# Patient Record
Sex: FEMALE | Race: WHITE | NOT HISPANIC OR LATINO
[De-identification: names, ages, dates, MRNs, and addresses within clinical notes are randomized per-mention and may not be internally consistent; named-entity substitution may affect disease eponyms.]

---

## 2021-10-18 DIAGNOSIS — Z00.00 ENCOUNTER FOR GENERAL ADULT MEDICAL EXAMINATION W/OUT ABNORMAL FINDINGS: ICD-10-CM

## 2021-10-19 ENCOUNTER — APPOINTMENT (OUTPATIENT)
Dept: ORTHOPEDIC SURGERY | Facility: CLINIC | Age: 61
End: 2021-10-19
Payer: COMMERCIAL

## 2021-10-19 VITALS
SYSTOLIC BLOOD PRESSURE: 156 MMHG | WEIGHT: 170 LBS | HEART RATE: 86 BPM | HEIGHT: 63.5 IN | BODY MASS INDEX: 29.75 KG/M2 | DIASTOLIC BLOOD PRESSURE: 92 MMHG

## 2021-10-19 PROCEDURE — 99203 OFFICE O/P NEW LOW 30 MIN: CPT | Mod: 25

## 2021-10-19 PROCEDURE — 20610 DRAIN/INJ JOINT/BURSA W/O US: CPT | Mod: LT

## 2021-10-19 PROCEDURE — 99072 ADDL SUPL MATRL&STAF TM PHE: CPT

## 2021-10-19 PROCEDURE — 73564 X-RAY EXAM KNEE 4 OR MORE: CPT | Mod: LT

## 2021-10-19 NOTE — HISTORY OF PRESENT ILLNESS
[de-identified] : This is a very nice  61 year old  female experiencing worsening pain in the left knee which is severe in intensity and has been going on for at least 3 months now. The pain limits activities of daily living. Walking tolerance is reduced. Medication (Advil and Glucosamine) and activity modification have been minimally effective for a period lasting greater than three months in duration. Assistive devices and external support were not deemed by the patient to be helpful in improving their function. The patient denies any radiation of the pain to the feet and it is not associated with numbness, tingling, or weakness.

## 2021-10-19 NOTE — PHYSICAL EXAM
[de-identified] : Well developed, well nourished in no apparent distress, awake, alert and orientated to person, place and time. with appropriate mood and affect. \par Respirations are even and unlabored. Gait evaluation does reveal a limp. There is no inguinal adenopathy. \par The affected limb is well-perfused, without skin lesions, shows a grossly normal motor and sensory examination. \par Knee motion is significantly reduced and does cause significant pain. ROM of the knee is 0-120 degrees.  5 degrees varus\par The knee is stable within that range-of-motion to AP and ML stress. \par Muscle strength is normal. Pedal pulses are palpable.  [de-identified] : Long standing  knee, AP knee, lateral knee, and patellar views of the left knee were ordered and taken in the office and demonstrate mild-mod degenerative joint disease of the knee with joint space narrowing, osteophyte formation, and subchondral sclerosis.

## 2021-10-19 NOTE — DISCUSSION/SUMMARY
[de-identified] : The patient has mild-mod left knee osteoarthritis. They are not an appropriate candidate for surgical intervention at this time. An extensive discussion was conducted on the natural history of the disease and the variety of surgical and non-surgical options available to the patient including, but not limited to non-steroidal anti-inflammatory medications, steroid injections, physical therapy, maintenance of ideal body weight, and reduction of activity. I recommended and prescribed a course of physical therapy. The patient will schedule an appointment as needed. \par \par Informed consent for the left knee injection was obtained. All questions were answered. A time out was performed. The  knee was prepped and draped in sterile fashion. Using sterile technique, 2cc of Kenalog, 4cc of 1% lidocaine, 4cc of 0.25% marcaine using a 21-gauge needle. A sterile dressing was applied. Post injection instructions were reviewed. The patient tolerated the procedure well.

## 2021-10-19 NOTE — REASON FOR VISIT
Telephone Encounter by Constance Merino MD at 08/15/17 03:13 PM     Author:  Constance Merino MD Service:  (none) Author Type:  Physician     Filed:  08/15/17 03:14 PM Encounter Date:  8/15/2017 Status:  Signed     :  Constance Merino MD (Physician)            i see pt has appt with me tomorrow  Will see her then[SO1.1M]  Dr. Constance Merino[SO1.1T]       Revision History        User Key Date/Time User Provider Type Action    > SO1.1 08/15/17 03:14 PM Constance Merino MD Physician Sign    M - Manual, T - Template             [Initial Visit] : an initial visit for [Knee Pain] : knee pain [Spouse] : spouse

## 2022-04-11 ENCOUNTER — APPOINTMENT (OUTPATIENT)
Dept: ORTHOPEDIC SURGERY | Facility: CLINIC | Age: 62
End: 2022-04-11
Payer: COMMERCIAL

## 2022-04-11 VITALS — HEIGHT: 63 IN | WEIGHT: 170 LBS | BODY MASS INDEX: 30.12 KG/M2

## 2022-04-11 PROCEDURE — 99072 ADDL SUPL MATRL&STAF TM PHE: CPT

## 2022-04-11 PROCEDURE — 20610 DRAIN/INJ JOINT/BURSA W/O US: CPT | Mod: LT

## 2022-04-11 PROCEDURE — 99214 OFFICE O/P EST MOD 30 MIN: CPT | Mod: 25

## 2022-04-11 RX ORDER — SERTRALINE HYDROCHLORIDE 100 MG/1
100 TABLET, FILM COATED ORAL
Qty: 30 | Refills: 0 | Status: ACTIVE | COMMUNITY
Start: 2021-08-23

## 2022-04-11 RX ORDER — AMOXICILLIN 875 MG/1
875 TABLET, FILM COATED ORAL
Qty: 10 | Refills: 0 | Status: ACTIVE | COMMUNITY
Start: 2022-02-07

## 2022-04-11 NOTE — REASON FOR VISIT
[Follow-Up Visit] : a follow-up visit for [Knee Pain] : knee pain [Osteoarthritis, Knee] : osteoarthritis, knee [Spouse] : spouse

## 2022-04-11 NOTE — PHYSICAL EXAM
[de-identified] : Well developed, well nourished in no apparent distress, awake, alert and orientated to person, place and time. with appropriate mood and affect. \par Respirations are even and unlabored. Gait evaluation does reveal a limp. There is no inguinal adenopathy. \par The affected limb is well-perfused, without skin lesions, shows a grossly normal motor and sensory examination. \par Knee motion is significantly reduced and does cause significant pain. ROM of the knee is 0-120 degrees.  5 degrees varus\par The knee is stable within that range-of-motion to AP and ML stress. \par Muscle strength is normal. Pedal pulses are palpable.  [de-identified] : Long standing  knee, AP knee, lateral knee, and patellar views of the left knee were ordered and taken in the office and demonstrate mild-mod degenerative joint disease of the knee with joint space narrowing, osteophyte formation, and subchondral sclerosis.

## 2022-04-11 NOTE — HISTORY OF PRESENT ILLNESS
[de-identified] : This is a very nice 62 year old  female experiencing worsening pain in the left knee which is severe in intensity and has been going on for at least 9 months now. The pain limits activities of daily living. Walking tolerance is reduced.  I have previously diagnosed with left knee osteoarthritis.  Medication (Advil and Glucosamine) and activity modification have been minimally effective for a period lasting greater than three months in duration.  Prior cortisone injection helped her for about 5 months. The patient denies any radiation of the pain to the feet and it is not associated with numbness, tingling, or weakness.

## 2022-04-11 NOTE — DISCUSSION/SUMMARY
[de-identified] : The patient has mild-mod left knee osteoarthritis. They are not an appropriate candidate for surgical intervention at this time. An extensive discussion was conducted on the natural history of the disease and the variety of surgical and non-surgical options available to the patient including, but not limited to non-steroidal anti-inflammatory medications, steroid injections, physical therapy, maintenance of ideal body weight, and reduction of activity.  She prefers to take a home exercise program rather than formal physical therapy.  Does not want a prescription for Mobic.  Today we performed a left knee intra-articular cortisone injection.  The patient will schedule an appointment as needed. \par \par Informed consent for the left knee injection was obtained. All questions were answered. A time out was performed. The left knee was prepped and draped in sterile fashion. Using sterile technique, the left knee was injected with 80mg of Kenalog, 4cc of 1% lidocaine, 4cc of 0.25% marcaine using a 21-gauge needle. A sterile dressing was applied. Post injection instructions were reviewed. The patient tolerated the procedure well.\par

## 2022-05-04 RX ORDER — HYALURONATE SODIUM 20 MG/2 ML
20 SYRINGE (ML) INTRAARTICULAR DAILY
Qty: 1 | Refills: 0 | Status: ACTIVE | COMMUNITY
Start: 2022-05-04

## 2022-05-17 ENCOUNTER — APPOINTMENT (OUTPATIENT)
Dept: ORTHOPEDIC SURGERY | Facility: CLINIC | Age: 62
End: 2022-05-17
Payer: COMMERCIAL

## 2022-05-17 PROCEDURE — 20610 DRAIN/INJ JOINT/BURSA W/O US: CPT

## 2022-05-17 PROCEDURE — 99072 ADDL SUPL MATRL&STAF TM PHE: CPT

## 2022-05-17 NOTE — PROCEDURE
[de-identified] : Injection: Left knee joint.\par Indication: Osteoarthritis.\par \par A discussion was had with the patient regarding this procedure and all questions were answered. All risks, benefits and alternatives were discussed. These included but were not limited to bleeding, infection, and allergic reaction. Alcohol was used to clean the skin, and betadine was used to sterilize and prep the area in the anterolateral aspect of the knee. Ethyl chloride spray was then used as a topical anesthetic. A 22-gauge needle was used to inject 2 cc of Euflexxa into the knee with ease. A sterile bandage was then applied. The patient tolerated the procedure well and there were no complications. \par \par Lot #: [id number]\par Exp: 2023-3-21\par \par The 1 of three Euflexxa injections was given today under sterile conditions into the left knee joint without complication (see procedure note). I again discussed the role of activity modification/icing following the injection to treat any local irritation from the injection. \par \par I will have the patient followup for the next injection in approximately 1 week.\par

## 2022-05-24 ENCOUNTER — APPOINTMENT (OUTPATIENT)
Dept: ORTHOPEDIC SURGERY | Facility: CLINIC | Age: 62
End: 2022-05-24
Payer: COMMERCIAL

## 2022-05-24 ENCOUNTER — TRANSCRIPTION ENCOUNTER (OUTPATIENT)
Age: 62
End: 2022-05-24

## 2022-05-24 PROCEDURE — 20610 DRAIN/INJ JOINT/BURSA W/O US: CPT

## 2022-05-24 PROCEDURE — 99072 ADDL SUPL MATRL&STAF TM PHE: CPT

## 2022-05-24 NOTE — PROCEDURE
[de-identified] : Injection: Left knee joint.\par Indication: Osteoarthritis.\par \par A discussion was had with the patient regarding this procedure and all questions were answered. All risks, benefits and alternatives were discussed. These included but were not limited to bleeding, infection, and allergic reaction. Alcohol was used to clean the skin, and betadine was used to sterilize and prep the area in the anterolateral aspect of the knee. Ethyl chloride spray was then used as a topical anesthetic. A 22-gauge needle was used to inject 2 cc of Euflexxa into the knee with ease. A sterile bandage was then applied. The patient tolerated the procedure well and there were no complications. \par \par Lot #: [id number]\par Exp: 2023-3-21\par \par The 2 of three Euflexxa injections was given today under sterile conditions into the left knee joint without complication (see procedure note). I again discussed the role of activity modification/icing following the injection to treat any local irritation from the injection. \par \par I will have the patient followup for the next injection in approximately 1 week.\par

## 2022-05-31 ENCOUNTER — APPOINTMENT (OUTPATIENT)
Dept: ORTHOPEDIC SURGERY | Facility: CLINIC | Age: 62
End: 2022-05-31
Payer: COMMERCIAL

## 2022-05-31 PROCEDURE — 99072 ADDL SUPL MATRL&STAF TM PHE: CPT

## 2022-05-31 PROCEDURE — 20610 DRAIN/INJ JOINT/BURSA W/O US: CPT

## 2022-05-31 NOTE — PROCEDURE
[de-identified] : Injection: Left knee joint.\par Indication: Osteoarthritis.\par \par A discussion was had with the patient regarding this procedure and all questions were answered. All risks, benefits and alternatives were discussed. These included but were not limited to bleeding, infection, and allergic reaction. Alcohol was used to clean the skin, and betadine was used to sterilize and prep the area in the anterolateral aspect of the knee. Ethyl chloride spray was then used as a topical anesthetic. A 22-gauge needle was used to inject 2 cc of Euflexxa into the knee with ease. A sterile bandage was then applied. The patient tolerated the procedure well and there were no complications. \par \par Lot #: [id number]\par Exp: 2023-3-21\par \par The 3 of three Euflexxa injections was given today under sterile conditions into the left knee joint without complication (see procedure note). I again discussed the role of activity modification/icing following the injection to treat any local irritation from the injection. \par

## 2022-06-10 ENCOUNTER — NON-APPOINTMENT (OUTPATIENT)
Age: 62
End: 2022-06-10

## 2022-06-13 RX ORDER — METHYLPREDNISOLONE 4 MG/1
4 TABLET ORAL
Qty: 1 | Refills: 0 | Status: ACTIVE | COMMUNITY
Start: 2022-06-13 | End: 1900-01-01

## 2022-07-06 ENCOUNTER — APPOINTMENT (OUTPATIENT)
Dept: ORTHOPEDIC SURGERY | Facility: CLINIC | Age: 62
End: 2022-07-06

## 2022-07-06 VITALS — WEIGHT: 170 LBS | BODY MASS INDEX: 29.02 KG/M2 | HEIGHT: 64 IN

## 2022-07-06 PROCEDURE — 99214 OFFICE O/P EST MOD 30 MIN: CPT

## 2022-07-06 PROCEDURE — 72100 X-RAY EXAM L-S SPINE 2/3 VWS: CPT

## 2022-07-06 PROCEDURE — 99072 ADDL SUPL MATRL&STAF TM PHE: CPT

## 2022-07-08 ENCOUNTER — APPOINTMENT (OUTPATIENT)
Dept: ORTHOPEDIC SURGERY | Facility: CLINIC | Age: 62
End: 2022-07-08

## 2022-07-08 VITALS — WEIGHT: 170 LBS | BODY MASS INDEX: 29.02 KG/M2 | HEIGHT: 64 IN

## 2022-07-08 PROCEDURE — 73502 X-RAY EXAM HIP UNI 2-3 VIEWS: CPT

## 2022-07-08 PROCEDURE — 99072 ADDL SUPL MATRL&STAF TM PHE: CPT

## 2022-07-08 PROCEDURE — 99214 OFFICE O/P EST MOD 30 MIN: CPT

## 2022-07-09 NOTE — HISTORY OF PRESENT ILLNESS
[de-identified] : This is a very nice 62 year old  female experiencing new complaint of right hip and low back and left knee worsening painwhich is severe in intensity and has been going on for more than 1 year.  She has known left knee osteoarthritis.. The pain limits activities of daily living. Walking tolerance is reduced.  I have previously diagnosed with left knee osteoarthritis.  Medication (Advil and Glucosamine) and activity modification have been minimally effective for a period lasting greater than three months in duration.  Prior cortisone injection helped her for about 5 months. The patient denies any radiation of the pain to the feet and it is not associated with numbness, tingling, or weakness.

## 2022-07-09 NOTE — DISCUSSION/SUMMARY
[de-identified] : The patient has mild-mod left knee osteoarthritis and right hip pain which appears to be extra-articular and potentially referred from the lumbar spine but I cannot rule out the hip as she does have a positive examination for hip findings.  This could represent labral pathology.. They are not an appropriate candidate for surgical intervention at this time. An extensive discussion was conducted on the natural history of the disease and the variety of surgical and non-surgical options available to the patient including, but not limited to non-steroidal anti-inflammatory medications, steroid injections, physical therapy, maintenance of ideal body weight, and reduction of activity.  She prefers to take a home exercise program rather than formal physical therapy.  Does not want a prescription for Mobic.  I will obtain an MRI of the lumbar spine to rule out stenosis and an MRI of the right hip to evaluate for labral tear.  The patient will be sent for a MRI of the right hip and lumbar spine. They will notify me when the MRI is complete and we will arrange for either an in person or telehealth virtual visit to review the results as well as any next steps in the plan.\par

## 2022-07-09 NOTE — REASON FOR VISIT
[Follow-Up Visit] : a follow-up visit for [Hip Pain] : hip pain [Knee Pain] : knee pain [Osteoarthritis, Knee] : osteoarthritis, knee

## 2022-07-09 NOTE — PHYSICAL EXAM
[de-identified] : Patient is well nourished, well-developed, in no acute distress, with appropriate mood and affect. The patient is oriented to time, place, and person. Respirations are even and unlabored. Gait evaluation reveals a limp. There is no inguinal adenopathy. Examination of the contralateral hip shows normal range of motion, strength, no tenderness, and intact skin. The affected limb is well-perfused, shows a grossly normal motor and sensory examination. Examination of the hip shows no skin lesions. Hip motion is reduced and causes pain. FADIR is positive and YARELI is positive. Stinchfield test is positive. Leg lengths are approximately equal. Both hips are stable and muscle strength is normal. Pedal pulses are palpable. The left limb is well-perfused, without skin lesions, shows a grossly normal motor and sensory examination. \par Knee motion is significantly reduced and does cause significant pain. ROM of the knee is 0-120 degrees.  5 degrees varus\par The knee is stable within that range-of-motion to AP and ML stress. \par Muscle strength is normal. Pedal pulses are palpable.  [de-identified] : Long standing  knee, AP knee, lateral knee, and patellar views of the left knee were reviewed from the previous visit and demonstrate mild-mod degenerative joint disease of the knee with joint space narrowing, osteophyte formation, and subchondral sclerosis. \par \par AP and lateral x-rays of the right hip, pelvis, and femur were ordered and taken in the office and demonstrate no evidence of degenerative joint disease of the hip with maintained joint space and no evidence of fractures or other intraarticular pathology.

## 2022-07-20 ENCOUNTER — APPOINTMENT (OUTPATIENT)
Dept: ORTHOPEDIC SURGERY | Facility: CLINIC | Age: 62
End: 2022-07-20

## 2022-07-20 ENCOUNTER — NON-APPOINTMENT (OUTPATIENT)
Age: 62
End: 2022-07-20

## 2022-07-20 DIAGNOSIS — M54.16 RADICULOPATHY, LUMBAR REGION: ICD-10-CM

## 2022-07-20 PROCEDURE — 99443: CPT

## 2022-07-26 ENCOUNTER — NON-APPOINTMENT (OUTPATIENT)
Age: 62
End: 2022-07-26

## 2022-07-27 ENCOUNTER — APPOINTMENT (OUTPATIENT)
Dept: ORTHOPEDIC SURGERY | Facility: CLINIC | Age: 62
End: 2022-07-27

## 2022-07-27 PROCEDURE — 99214 OFFICE O/P EST MOD 30 MIN: CPT | Mod: 25

## 2022-07-27 PROCEDURE — 20610 DRAIN/INJ JOINT/BURSA W/O US: CPT | Mod: LT

## 2022-07-27 PROCEDURE — 99072 ADDL SUPL MATRL&STAF TM PHE: CPT

## 2022-07-27 NOTE — HISTORY OF PRESENT ILLNESS
[de-identified] : This is a very nice  61 year old  female experiencing worsening pain in the left knee which is severe in intensity and has been going on for at least 3 months now. L knee MRI shows  OA and meniscal tear, degenerative.  The pain limits activities of daily living. Walking tolerance is reduced. Medication (Advil and Glucosamine) and activity modification have been minimally effective for a period lasting greater than three months in duration. Assistive devices and external support were not deemed by the patient to be helpful in improving their function. The patient denies any radiation of the pain to the feet and it is not associated with numbness, tingling, or weakness.

## 2022-07-27 NOTE — DISCUSSION/SUMMARY
[de-identified] : The patient has left knee osteoarthritis and degenerative meniscus tear on MRI. They are not an appropriate candidate for surgical intervention at this time. An extensive discussion was conducted on the natural history of the disease and the variety of surgical and non-surgical options available to the patient including, but not limited to non-steroidal anti-inflammatory medications, steroid injections, physical therapy, maintenance of ideal body weight, and reduction of activity. I recommended a prescription of Mobic but the patient would prefer to use OTC NSAIDs at this time. The patient will schedule an appointment as needed. \par \par Informed consent for the left knee injection was obtained. All questions were answered. A time out was performed. The  knee was prepped and draped in sterile fashion. Using sterile technique, 80mg of Kenalog, 4cc of 1% lidocaine, 4cc of 0.25% marcaine using a 21-gauge needle. A sterile dressing was applied. Post injection instructions were reviewed. The patient tolerated the procedure well.

## 2022-07-27 NOTE — PHYSICAL EXAM
[de-identified] : Well developed, well nourished in no apparent distress, awake, alert and orientated to person, place and time. with appropriate mood and affect. \par Respirations are even and unlabored. Gait evaluation does reveal a limp. There is no inguinal adenopathy. \par The affected limb is well-perfused, without skin lesions, shows a grossly normal motor and sensory examination. \par Knee motion is significantly reduced and does cause significant pain. ROM of the knee is 0-120 degrees.  5 degrees varus\par The knee is stable within that range-of-motion to AP and ML stress. \par Muscle strength is normal. Pedal pulses are palpable.

## 2022-08-01 ENCOUNTER — APPOINTMENT (OUTPATIENT)
Dept: ORTHOPEDIC SURGERY | Facility: CLINIC | Age: 62
End: 2022-08-01

## 2022-08-01 VITALS — HEIGHT: 64 IN | BODY MASS INDEX: 28.17 KG/M2 | WEIGHT: 165 LBS

## 2022-08-01 PROCEDURE — 99072 ADDL SUPL MATRL&STAF TM PHE: CPT

## 2022-08-01 PROCEDURE — 99215 OFFICE O/P EST HI 40 MIN: CPT

## 2022-08-01 NOTE — DISCUSSION/SUMMARY
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  She also severe edema of the proximal femur.  She has failed a course of conservative management and would like to proceed with a direct anterior approach right total of arthroplasty.\par \par The patient is an appropriate candidate for consideration of right total hip replacement. An extensive discussion was conducted of the natural history of the disease and the variety of surgical and non-surgical treatment options available to the patient. A risk/benefit analysis was discussed with the patient reviewing the advantages and disadvantages of surgical intervention at this time. Both the level and length of the patient's pain have made additional conservative treatment measures consisting of NSAIDs, physical therapy, and/or corticosteroids contraindicated. A full explanation was given of the nature and the purpose of the procedure and anesthesia, its benefits, possible alternative methods of diagnosis or treatment, the risks involved, the possibility of complications, the foreseeable consequences of the procedure and the possible results of the non-treatment. I reviewed the plan of care as well as used a model of a total hip implant equivalent to the one that will be used for their total hip joint replacement. The ability to secure the implant utilizing cement or cementless (press-fit) was discussed with the patient. The patient agrees with the plan of care, as well as the use of implants for their total hip replacement. \par \par No guarantee or assurance was made as to the results that may be obtained. Specifically, the risks were identified to include, but are not limited to the following: Infection, phlebitis, pulmonary embolism, death, paralysis, dislocation, pain, stiffness, instability, limp, weakness, breakage, leg-length inequality, uncontrolled bleeding, nerve injury, blood vessel injury, pressure sores, anesthetic risks, delayed healing of wound and bone, and wear and loosening. Further discussion was undertaken with the patient about the details of surgical preparation, treatment, and postoperative rehabilitation including medical clearance, autotransfusion, the hospital course, and the postoperative rehabilitation involved. All in all, I feel that this patient is a good candidate for surgical reconstruction.\par \par The patient and I discussed the current SARS-CoV-2 (COVID-19) pandemic which has affected our local hospitals. We discussed that our hospitals treat patients with COVID-19. All efforts will be made to avoid cohorting the patient with diagnosed or suspected COVID-19 patient. They also understand that we will screen them 24-48 hours prior to surgery. Despite our best efforts, there is a potential risk for iatrogenic transmission of COVID-19 to the patient during the perioperative period. Yael COVID-19 during the perioperative period may increase the patient´s risks of an adverse outcome including postoperative pneumonia, difficulty breathing, requirement for a breathing tube (general endotracheal intubation), and death. The patient is understanding of this risk, and is willing to proceed with surgery at this time.\par \par The patient and I discussed the option for outpatient joint replacement. They will not stay overnight in the hospital after surgery and will discharge home on the same day of surgery. The risks and benefits of this type of rapid recovery protocol was reviewed with the patient and they are in agreement with proceeding with outpatient joint replacement surgery. They understand that in the event of an emergency, that they will be transferred to the main hospital for inpatient care.

## 2022-08-01 NOTE — PHYSICAL EXAM
[de-identified] : Patient is well nourished, well-developed, in no acute distress, with appropriate mood and affect. The patient is oriented to time, place, and person. Respirations are even and unlabored. Gait evaluation reveals a limp. There is no inguinal adenopathy. Examination of the contralateral hip shows normal range of motion, strength, no tenderness, and intact skin. The affected limb is well-perfused, shows a grossly normal motor and sensory examination. Examination of the hip shows no skin lesions. Hip motion is reduced and causes pain. FADIR is positive and YARELI is positive. Stinchfield test is positive. Leg lengths are approximately equal. Both hips are stable and muscle strength is normal. Pedal pulses are palpable. [de-identified] : AP and lateral x-rays of the right hip, pelvis, and femur were reviewed from the previous visit and demonstrate no evidence of degenerative joint disease of the hip with maintained joint space and no evidence of fractures or other intraarticular pathology.\par \par The patient brings with her an MRI of the right hip.  I reviewed the MR imaging with the patient which demonstrates right hip osteonecrosis with severe edema throughout the hip as well as a large hip joint effusion.  There is also mild collapse of the femoral head.

## 2022-08-01 NOTE — HISTORY OF PRESENT ILLNESS
[de-identified] : This is very nice 62-year-old female experiencing right hip and groin and thigh pain, which is severe in intensity. The pain substantially limits activities of daily living. Walking tolerance is reduced. Medication and activity modification have been minimally effective for a period lasting greater than three months in duration. Assistive devices and external support were not deemed by the patient to be helpful in improving their function. Due to the severity of osteoarthritis and level of pain, physical therapy is contraindicated. Pain and restriction of function are intolerable at this time. The patient denies any radiation of the pain to the feet and it is not associated with numbness, tingling, or weakness.

## 2022-08-22 ENCOUNTER — APPOINTMENT (OUTPATIENT)
Dept: ORTHOPEDIC SURGERY | Facility: CLINIC | Age: 62
End: 2022-08-22

## 2022-09-02 ENCOUNTER — OUTPATIENT (OUTPATIENT)
Dept: OUTPATIENT SERVICES | Facility: HOSPITAL | Age: 62
LOS: 1 days | End: 2022-09-02
Payer: COMMERCIAL

## 2022-09-02 VITALS
HEART RATE: 72 BPM | HEIGHT: 64 IN | OXYGEN SATURATION: 96 % | TEMPERATURE: 99 F | SYSTOLIC BLOOD PRESSURE: 158 MMHG | RESPIRATION RATE: 15 BRPM | WEIGHT: 164.02 LBS | DIASTOLIC BLOOD PRESSURE: 66 MMHG

## 2022-09-02 DIAGNOSIS — Z01.818 ENCOUNTER FOR OTHER PREPROCEDURAL EXAMINATION: ICD-10-CM

## 2022-09-02 DIAGNOSIS — M16.11 UNILATERAL PRIMARY OSTEOARTHRITIS, RIGHT HIP: ICD-10-CM

## 2022-09-02 DIAGNOSIS — Z98.890 OTHER SPECIFIED POSTPROCEDURAL STATES: Chronic | ICD-10-CM

## 2022-09-02 DIAGNOSIS — I49.8 OTHER SPECIFIED CARDIAC ARRHYTHMIAS: ICD-10-CM

## 2022-09-02 PROCEDURE — 86901 BLOOD TYPING SEROLOGIC RH(D): CPT

## 2022-09-02 PROCEDURE — 86900 BLOOD TYPING SEROLOGIC ABO: CPT

## 2022-09-02 PROCEDURE — 83036 HEMOGLOBIN GLYCOSYLATED A1C: CPT

## 2022-09-02 PROCEDURE — 93005 ELECTROCARDIOGRAM TRACING: CPT

## 2022-09-02 PROCEDURE — 87640 STAPH A DNA AMP PROBE: CPT

## 2022-09-02 PROCEDURE — 36415 COLL VENOUS BLD VENIPUNCTURE: CPT

## 2022-09-02 PROCEDURE — 85027 COMPLETE CBC AUTOMATED: CPT

## 2022-09-02 PROCEDURE — 87641 MR-STAPH DNA AMP PROBE: CPT

## 2022-09-02 PROCEDURE — 86850 RBC ANTIBODY SCREEN: CPT

## 2022-09-02 PROCEDURE — 80048 BASIC METABOLIC PNL TOTAL CA: CPT

## 2022-09-02 PROCEDURE — G0463: CPT

## 2022-09-02 PROCEDURE — 93010 ELECTROCARDIOGRAM REPORT: CPT

## 2022-09-02 RX ORDER — SODIUM CHLORIDE 9 MG/ML
1000 INJECTION, SOLUTION INTRAVENOUS
Refills: 0 | Status: DISCONTINUED | OUTPATIENT
Start: 2022-09-12 | End: 2022-09-26

## 2022-09-02 NOTE — H&P PST ADULT - NSICDXPASTMEDICALHX_GEN_ALL_CORE_FT
PAST MEDICAL HISTORY:  Chronic back pain     Depression     Left knee pain     Mitral valve prolapse     PVCs (premature ventricular contractions) ablation    Unilateral primary osteoarthritis, right hip      PAST MEDICAL HISTORY:  Bigeminy     Chronic back pain     Depression     Left knee pain     Mitral valve prolapse     PVCs (premature ventricular contractions) ablation    Unilateral primary osteoarthritis, right hip

## 2022-09-02 NOTE — H&P PST ADULT - HISTORY OF PRESENT ILLNESS
62 yr old female  with hx of PVCs s/p ablation, osteoarthritis, chronic knee and back pain. Recently had back pain evaluation and was referred for work up of right hip.  Referred for total right hip  replacement.     **COVID  denies foreign travel  denies s/s   denies known exposure  swab 9/9 Zhang  vaccine Pfizer x2 20221 does not have dates     ***HX OF PVC s/p ablation *****  ek[facility] at [facility] asymptomatic  discussed via Teams with Dr. Garcia and Dr. Giron.  Pt to see cardiologist pre op to surgery.

## 2022-09-02 NOTE — H&P PST ADULT - NSANTHOSAYNRD_GEN_A_CORE
No. NADEEM screening performed.  STOP BANG Legend: 0-2 = LOW Risk; 3-4 = INTERMEDIATE Risk; 5-8 = HIGH Risk

## 2022-09-02 NOTE — H&P PST ADULT - ASSESSMENT
CAPRINI SCORE [CLOT]    AGE RELATED RISK FACTORS                                                       MOBILITY RELATED FACTORS  [ ] Age 41-60 years                                            (1 Point)                  [ ] Bed rest                                                        (1 Point)  [ x] Age: 61-74 years                                           (2 Points)                 [ ] Plaster cast                                                   (2 Points)  [ ] Age= 75 years                                              (3 Points)                 [ ] Bed bound for more than 72 hours                 (2 Points)    DISEASE RELATED RISK FACTORS                                               GENDER SPECIFIC FACTORS  [ ] Edema in the lower extremities                       (1 Point)                  [ ] Pregnancy                                                     (1 Point)  [ ] Varicose veins                                               (1 Point)                  [ ] Post-partum < 6 weeks                                   (1 Point)             [x ] BMI > 25 Kg/m2                                            (1 Point)                  [ ] Hormonal therapy  or oral contraception          (1 Point)                 [ ] Sepsis (in the previous month)                        (1 Point)                  [ ] History of pregnancy complications                 (1 point)  [ ] Pneumonia or serious lung disease                                               [ ] Unexplained or recurrent                     (1 Point)           (in the previous month)                               (1 Point)  [ ] Abnormal pulmonary function test                     (1 Point)                 SURGERY RELATED RISK FACTORS  [ ] Acute myocardial infarction                              (1 Point)                 [ ]  Section                                             (1 Point)  [ ] Congestive heart failure (in the previous month)  (1 Point)               [ ] Minor surgery                                                  (1 Point)   [ ] Inflammatory bowel disease                             (1 Point)                 [ ] Arthroscopic surgery                                        (2 Points)  [ ] Central venous access                                      (2 Points)                [ ] General surgery lasting more than 45 minutes   (2 Points)       [ ] Stroke (in the previous month)                          (5 Points)               [x ] Elective arthroplasty                                         (5 Points)                                                                                                                                               HEMATOLOGY RELATED FACTORS                                                 TRAUMA RELATED RISK FACTORS  [ ] Prior episodes of VTE                                     (3 Points)                [ ] Fracture of the hip, pelvis, or leg                       (5 Points)  [ ] Positive family history for VTE                         (3 Points)                 [ ] Acute spinal cord injury (in the previous month)  (5 Points)  [ ] Prothrombin 02528 A                                     (3 Points)                 [ ] Paralysis  (less than 1 month)                             (5 Points)  [ ] Factor V Leiden                                             (3 Points)                  [ ] Multiple Trauma within 1 month                        (5 Points)  [ ] Lupus anticoagulants                                     (3 Points)                                                           [ ] Anticardiolipin antibodies                               (3 Points)                                                       [ ] High homocysteine in the blood                      (3 Points)                                             [ ] Other congenital or acquired thrombophilia      (3 Points)                                                [ ] Heparin induced thrombocytopenia                  (3 Points)                                          Total Score [     8     ]    Caprini Score 0 - 2:  Low Risk, No VTE Prophylaxis required for most patients, encourage ambulation  Caprini Score 3 - 6:  At Risk, pharmacologic VTE prophylaxis is indicated for most patients (in the absence of a contraindication)  Caprini Score Greater than or = 7:  High Risk, pharmacologic VTE prophylaxis is indicated for most patients (in the absence of a contraindication)

## 2022-09-02 NOTE — H&P PST ADULT - PROBLEM SELECTOR PLAN 2
hx of PVCs s/p ablation  EKG today noted bigeminy  pt instructed to make appt with cardiologist for evaluation pre op to surgery   discussed with Dr. Garcia and Dr. Giron via Teams

## 2022-09-02 NOTE — H&P PST ADULT - FALL HARM RISK - HARM RISK INTERVENTIONS

## 2022-09-06 RX ORDER — PANTOPRAZOLE 40 MG/1
40 TABLET, DELAYED RELEASE ORAL DAILY
Qty: 30 | Refills: 0 | Status: ACTIVE | COMMUNITY
Start: 2022-09-06 | End: 1900-01-01

## 2022-09-06 RX ORDER — TRAMADOL HYDROCHLORIDE 50 MG/1
50 TABLET, COATED ORAL
Qty: 40 | Refills: 0 | Status: ACTIVE | COMMUNITY
Start: 2022-09-06 | End: 1900-01-01

## 2022-09-06 RX ORDER — NAPROXEN 500 MG/1
500 TABLET ORAL
Qty: 40 | Refills: 1 | Status: ACTIVE | COMMUNITY
Start: 2022-09-06 | End: 1900-01-01

## 2022-09-06 RX ORDER — ASPIRIN 81 MG/1
81 TABLET ORAL
Qty: 60 | Refills: 0 | Status: ACTIVE | COMMUNITY
Start: 2022-09-06 | End: 1900-01-01

## 2022-09-06 RX ORDER — OXYCODONE 5 MG/1
5 TABLET ORAL
Qty: 40 | Refills: 0 | Status: ACTIVE | COMMUNITY
Start: 2022-09-06 | End: 1900-01-01

## 2022-09-07 PROBLEM — M25.562 PAIN IN LEFT KNEE: Chronic | Status: ACTIVE | Noted: 2022-09-02

## 2022-09-07 PROBLEM — I49.3 VENTRICULAR PREMATURE DEPOLARIZATION: Chronic | Status: ACTIVE | Noted: 2022-09-02

## 2022-09-07 PROBLEM — I49.8 OTHER SPECIFIED CARDIAC ARRHYTHMIAS: Chronic | Status: ACTIVE | Noted: 2022-09-02

## 2022-09-07 PROBLEM — M16.11 UNILATERAL PRIMARY OSTEOARTHRITIS, RIGHT HIP: Chronic | Status: ACTIVE | Noted: 2022-09-02

## 2022-09-07 PROBLEM — M54.9 DORSALGIA, UNSPECIFIED: Chronic | Status: ACTIVE | Noted: 2022-09-02

## 2022-09-09 ENCOUNTER — OUTPATIENT (OUTPATIENT)
Dept: OUTPATIENT SERVICES | Facility: HOSPITAL | Age: 62
LOS: 1 days | End: 2022-09-09
Payer: COMMERCIAL

## 2022-09-09 DIAGNOSIS — Z11.52 ENCOUNTER FOR SCREENING FOR COVID-19: ICD-10-CM

## 2022-09-09 DIAGNOSIS — Z98.890 OTHER SPECIFIED POSTPROCEDURAL STATES: Chronic | ICD-10-CM

## 2022-09-09 LAB — SARS-COV-2 RNA SPEC QL NAA+PROBE: SIGNIFICANT CHANGE UP

## 2022-09-09 PROCEDURE — C9803: CPT

## 2022-09-09 PROCEDURE — U0003: CPT

## 2022-09-09 PROCEDURE — U0005: CPT

## 2022-09-11 ENCOUNTER — TRANSCRIPTION ENCOUNTER (OUTPATIENT)
Age: 62
End: 2022-09-11

## 2022-09-12 ENCOUNTER — TRANSCRIPTION ENCOUNTER (OUTPATIENT)
Age: 62
End: 2022-09-12

## 2022-09-12 ENCOUNTER — OUTPATIENT (OUTPATIENT)
Dept: OUTPATIENT SERVICES | Facility: HOSPITAL | Age: 62
LOS: 1 days | End: 2022-09-12
Payer: COMMERCIAL

## 2022-09-12 ENCOUNTER — APPOINTMENT (OUTPATIENT)
Dept: ORTHOPEDIC SURGERY | Facility: HOSPITAL | Age: 62
End: 2022-09-12

## 2022-09-12 ENCOUNTER — RESULT REVIEW (OUTPATIENT)
Age: 62
End: 2022-09-12

## 2022-09-12 VITALS
RESPIRATION RATE: 16 BRPM | OXYGEN SATURATION: 98 % | WEIGHT: 164.02 LBS | TEMPERATURE: 98 F | HEIGHT: 64 IN | HEART RATE: 45 BPM | SYSTOLIC BLOOD PRESSURE: 135 MMHG | DIASTOLIC BLOOD PRESSURE: 78 MMHG

## 2022-09-12 DIAGNOSIS — Z98.890 OTHER SPECIFIED POSTPROCEDURAL STATES: Chronic | ICD-10-CM

## 2022-09-12 DIAGNOSIS — M16.11 UNILATERAL PRIMARY OSTEOARTHRITIS, RIGHT HIP: ICD-10-CM

## 2022-09-12 LAB
ANION GAP SERPL CALC-SCNC: 6 MMOL/L — SIGNIFICANT CHANGE UP (ref 5–17)
BUN SERPL-MCNC: 13 MG/DL — SIGNIFICANT CHANGE UP (ref 7–23)
CALCIUM SERPL-MCNC: 8.8 MG/DL — SIGNIFICANT CHANGE UP (ref 8.4–10.5)
CHLORIDE SERPL-SCNC: 104 MMOL/L — SIGNIFICANT CHANGE UP (ref 96–108)
CO2 SERPL-SCNC: 31 MMOL/L — SIGNIFICANT CHANGE UP (ref 22–31)
CREAT SERPL-MCNC: 0.71 MG/DL — SIGNIFICANT CHANGE UP (ref 0.5–1.3)
EGFR: 95 ML/MIN/1.73M2 — SIGNIFICANT CHANGE UP
GLUCOSE SERPL-MCNC: 107 MG/DL — HIGH (ref 70–99)
HCT VFR BLD CALC: 39.6 % — SIGNIFICANT CHANGE UP (ref 34.5–45)
HGB BLD-MCNC: 12.6 G/DL — SIGNIFICANT CHANGE UP (ref 11.5–15.5)
MCHC RBC-ENTMCNC: 29.9 PG — SIGNIFICANT CHANGE UP (ref 27–34)
MCHC RBC-ENTMCNC: 31.8 GM/DL — LOW (ref 32–36)
MCV RBC AUTO: 93.8 FL — SIGNIFICANT CHANGE UP (ref 80–100)
NRBC # BLD: 0 /100 WBCS — SIGNIFICANT CHANGE UP (ref 0–0)
PLATELET # BLD AUTO: 186 K/UL — SIGNIFICANT CHANGE UP (ref 150–400)
POTASSIUM SERPL-MCNC: 5.5 MMOL/L — HIGH (ref 3.5–5.3)
POTASSIUM SERPL-SCNC: 5.5 MMOL/L — HIGH (ref 3.5–5.3)
RBC # BLD: 4.22 M/UL — SIGNIFICANT CHANGE UP (ref 3.8–5.2)
RBC # FLD: 13.9 % — SIGNIFICANT CHANGE UP (ref 10.3–14.5)
SODIUM SERPL-SCNC: 141 MMOL/L — SIGNIFICANT CHANGE UP (ref 135–145)
WBC # BLD: 6.68 K/UL — SIGNIFICANT CHANGE UP (ref 3.8–10.5)
WBC # FLD AUTO: 6.68 K/UL — SIGNIFICANT CHANGE UP (ref 3.8–10.5)

## 2022-09-12 PROCEDURE — 72170 X-RAY EXAM OF PELVIS: CPT

## 2022-09-12 PROCEDURE — 82962 GLUCOSE BLOOD TEST: CPT

## 2022-09-12 PROCEDURE — C1776: CPT

## 2022-09-12 PROCEDURE — 72170 X-RAY EXAM OF PELVIS: CPT | Mod: 26

## 2022-09-12 PROCEDURE — 97161 PT EVAL LOW COMPLEX 20 MIN: CPT

## 2022-09-12 PROCEDURE — 88311 DECALCIFY TISSUE: CPT | Mod: 26

## 2022-09-12 PROCEDURE — 80048 BASIC METABOLIC PNL TOTAL CA: CPT

## 2022-09-12 PROCEDURE — 88304 TISSUE EXAM BY PATHOLOGIST: CPT

## 2022-09-12 PROCEDURE — 85027 COMPLETE CBC AUTOMATED: CPT

## 2022-09-12 PROCEDURE — 97165 OT EVAL LOW COMPLEX 30 MIN: CPT

## 2022-09-12 PROCEDURE — 88304 TISSUE EXAM BY PATHOLOGIST: CPT | Mod: 26

## 2022-09-12 PROCEDURE — 27130 TOTAL HIP ARTHROPLASTY: CPT | Mod: RT

## 2022-09-12 PROCEDURE — 88311 DECALCIFY TISSUE: CPT

## 2022-09-12 DEVICE — SHELL ACT MULTIHOLE TRIDENT II D 48MM: Type: IMPLANTABLE DEVICE | Status: FUNCTIONAL

## 2022-09-12 DEVICE — HEAD BIOLOX DELTA CERAMIC V40 32MM PLUS0: Type: IMPLANTABLE DEVICE | Status: FUNCTIONAL

## 2022-09-12 DEVICE — LINER ACET TRIDENT X3 0 DEG 32MM D: Type: IMPLANTABLE DEVICE | Status: FUNCTIONAL

## 2022-09-12 DEVICE — NECK ANGL HIP STEM 2 ACCOLADE II 127DEG 30MM: Type: IMPLANTABLE DEVICE | Status: FUNCTIONAL

## 2022-09-12 RX ORDER — PANTOPRAZOLE SODIUM 20 MG/1
40 TABLET, DELAYED RELEASE ORAL
Refills: 0 | Status: DISCONTINUED | OUTPATIENT
Start: 2022-09-12 | End: 2022-09-26

## 2022-09-12 RX ORDER — PREGABALIN 225 MG/1
1 CAPSULE ORAL
Qty: 0 | Refills: 0 | DISCHARGE

## 2022-09-12 RX ORDER — OXYCODONE HYDROCHLORIDE 5 MG/1
5 TABLET ORAL EVERY 4 HOURS
Refills: 0 | Status: DISCONTINUED | OUTPATIENT
Start: 2022-09-12 | End: 2022-09-12

## 2022-09-12 RX ORDER — OXYCODONE HYDROCHLORIDE 5 MG/1
10 TABLET ORAL EVERY 4 HOURS
Refills: 0 | Status: DISCONTINUED | OUTPATIENT
Start: 2022-09-12 | End: 2022-09-12

## 2022-09-12 RX ORDER — TRAMADOL HYDROCHLORIDE 50 MG/1
50 TABLET ORAL ONCE
Refills: 0 | Status: DISCONTINUED | OUTPATIENT
Start: 2022-09-12 | End: 2022-09-12

## 2022-09-12 RX ORDER — KETOROLAC TROMETHAMINE 30 MG/ML
15 SYRINGE (ML) INJECTION EVERY 6 HOURS
Refills: 0 | Status: COMPLETED | OUTPATIENT
Start: 2022-09-12 | End: 2022-09-13

## 2022-09-12 RX ORDER — SODIUM CHLORIDE 9 MG/ML
1000 INJECTION, SOLUTION INTRAVENOUS ONCE
Refills: 0 | Status: COMPLETED | OUTPATIENT
Start: 2022-09-12 | End: 2022-09-12

## 2022-09-12 RX ORDER — ACETAMINOPHEN 500 MG
1000 TABLET ORAL ONCE
Refills: 0 | Status: DISCONTINUED | OUTPATIENT
Start: 2022-09-12 | End: 2022-09-26

## 2022-09-12 RX ORDER — CELECOXIB 200 MG/1
1 CAPSULE ORAL
Qty: 0 | Refills: 0 | DISCHARGE

## 2022-09-12 RX ORDER — MAGNESIUM HYDROXIDE 400 MG/1
30 TABLET, CHEWABLE ORAL DAILY
Refills: 0 | Status: DISCONTINUED | OUTPATIENT
Start: 2022-09-12 | End: 2022-09-26

## 2022-09-12 RX ORDER — SENNA PLUS 8.6 MG/1
2 TABLET ORAL AT BEDTIME
Refills: 0 | Status: DISCONTINUED | OUTPATIENT
Start: 2022-09-12 | End: 2022-09-26

## 2022-09-12 RX ORDER — LIDOCAINE HCL 20 MG/ML
0.2 VIAL (ML) INJECTION ONCE
Refills: 0 | Status: COMPLETED | OUTPATIENT
Start: 2022-09-12 | End: 2022-09-12

## 2022-09-12 RX ORDER — PANTOPRAZOLE SODIUM 20 MG/1
40 TABLET, DELAYED RELEASE ORAL ONCE
Refills: 0 | Status: COMPLETED | OUTPATIENT
Start: 2022-09-12 | End: 2022-09-12

## 2022-09-12 RX ORDER — CEFAZOLIN SODIUM 1 G
2000 VIAL (EA) INJECTION ONCE
Refills: 0 | Status: COMPLETED | OUTPATIENT
Start: 2022-09-12 | End: 2022-09-12

## 2022-09-12 RX ORDER — ASPIRIN/CALCIUM CARB/MAGNESIUM 324 MG
81 TABLET ORAL
Refills: 0 | Status: DISCONTINUED | OUTPATIENT
Start: 2022-09-12 | End: 2022-09-26

## 2022-09-12 RX ORDER — ONDANSETRON 8 MG/1
4 TABLET, FILM COATED ORAL EVERY 6 HOURS
Refills: 0 | Status: DISCONTINUED | OUTPATIENT
Start: 2022-09-12 | End: 2022-09-26

## 2022-09-12 RX ORDER — CEFAZOLIN SODIUM 1 G
2000 VIAL (EA) INJECTION EVERY 8 HOURS
Refills: 0 | Status: DISCONTINUED | OUTPATIENT
Start: 2022-09-12 | End: 2022-09-26

## 2022-09-12 RX ORDER — POLYETHYLENE GLYCOL 3350 17 G/17G
17 POWDER, FOR SOLUTION ORAL AT BEDTIME
Refills: 0 | Status: DISCONTINUED | OUTPATIENT
Start: 2022-09-12 | End: 2022-09-26

## 2022-09-12 RX ORDER — TRAMADOL HYDROCHLORIDE 50 MG/1
50 TABLET ORAL EVERY 6 HOURS
Refills: 0 | Status: DISCONTINUED | OUTPATIENT
Start: 2022-09-12 | End: 2022-09-12

## 2022-09-12 RX ORDER — CHLORHEXIDINE GLUCONATE 213 G/1000ML
1 SOLUTION TOPICAL ONCE
Refills: 0 | Status: COMPLETED | OUTPATIENT
Start: 2022-09-12 | End: 2022-09-12

## 2022-09-12 RX ADMIN — OXYCODONE HYDROCHLORIDE 5 MILLIGRAM(S): 5 TABLET ORAL at 15:27

## 2022-09-12 RX ADMIN — PANTOPRAZOLE SODIUM 40 MILLIGRAM(S): 20 TABLET, DELAYED RELEASE ORAL at 08:18

## 2022-09-12 RX ADMIN — TRAMADOL HYDROCHLORIDE 50 MILLIGRAM(S): 50 TABLET ORAL at 17:17

## 2022-09-12 RX ADMIN — TRAMADOL HYDROCHLORIDE 50 MILLIGRAM(S): 50 TABLET ORAL at 08:18

## 2022-09-12 RX ADMIN — Medication 81 MILLIGRAM(S): at 18:20

## 2022-09-12 RX ADMIN — Medication 15 MILLIGRAM(S): at 18:00

## 2022-09-12 RX ADMIN — Medication 15 MILLIGRAM(S): at 17:29

## 2022-09-12 RX ADMIN — SODIUM CHLORIDE 1000 MILLILITER(S): 9 INJECTION, SOLUTION INTRAVENOUS at 13:45

## 2022-09-12 RX ADMIN — OXYCODONE HYDROCHLORIDE 5 MILLIGRAM(S): 5 TABLET ORAL at 16:00

## 2022-09-12 RX ADMIN — SODIUM CHLORIDE 100 MILLILITER(S): 9 INJECTION, SOLUTION INTRAVENOUS at 13:45

## 2022-09-12 RX ADMIN — CHLORHEXIDINE GLUCONATE 1 APPLICATION(S): 213 SOLUTION TOPICAL at 08:19

## 2022-09-12 RX ADMIN — TRAMADOL HYDROCHLORIDE 50 MILLIGRAM(S): 50 TABLET ORAL at 16:30

## 2022-09-12 RX ADMIN — Medication 100 MILLIGRAM(S): at 18:21

## 2022-09-12 RX ADMIN — SODIUM CHLORIDE 1000 MILLILITER(S): 9 INJECTION, SOLUTION INTRAVENOUS at 17:23

## 2022-09-12 RX ADMIN — SODIUM CHLORIDE 100 MILLILITER(S): 9 INJECTION, SOLUTION INTRAVENOUS at 08:19

## 2022-09-12 NOTE — ASU DISCHARGE PLAN (ADULT/PEDIATRIC) - ASU DC SPECIAL INSTRUCTIONSFT
FOLLOW UP: Please follow up with your surgeon in 2 weeks, call to make an appt.  DIET: You may resume your regular diet. Narcotic pain medicine can cause extreme nausea and constipation. Drink plenty of water and take diuretics (colace, Miralax) as needed. You can get them from your local pharmacy.  WOUND CARE:  Please keep incisions clean and dry. Please do not scrub or rub incisions. Do not use lotion or powder on incisions.  Please leave Aquacel bandage on your incision until date written on bandage  BATHING: Keep your surgical area clean and dry. You may shower or use sponge bath to bathe.  Do not submerge wound underwater.   PAIN CONTROL:  Alternate between taking Ibuprofen and Tylenol so you are taking pain medication every 3 to 4 hours. You should take oxycodone in addition to this only if tylenol and ibuprofen are not working for your pain. If prescribed Tramadol and Oxycodone, do not take tramadol and oxycodone at the same time, use tramadol for mild to moderate pain and oxycodone for moderate to severe pain.    It is important to ice and elevate your leg to keep swelling down and the pain manageable. Keep the ice on for 20 minutes, and then keep off for 20 minutes. Repeat while awake.  MEDICATIONS: Take all medications as prescribed. It is important to take Aspirin to prevent dangerous blood clots (deep vein thrombosis).   ACTIVITY: No heavy lifting or straining for 2 weeks. Otherwise, you may return to your usual level of physical activity. If you are taking narcotic pain medication (such as vicodin, oxycodone, or Percocet) DO NOT drive a car, operate machinery or make important decisions.  NOTIFY YOUR SURGEON IF: You have any bleeding that does not stop, any pus draining from your wound(s), any fever (over 100.4 F) or chills, persistent nausea/vomiting, persistent diarrhea, or if your pain is not controlled on your discharge pain medications.

## 2022-09-12 NOTE — PHYSICAL THERAPY INITIAL EVALUATION ADULT - ADDITIONAL COMMENTS
PTA pt was independent with functional mobility and ADLs. Pt lives in a  with her  1 step to enter 1 flight to her bedroom.

## 2022-09-12 NOTE — PRE-ANESTHESIA EVALUATION ADULT - NSANTHPMHFT_GEN_ALL_CORE
s/p ablation of PVC's  now in bigeminy unchanged from previous, cardiac eval and recent stress test in chart  no recent CP, SOB or syncope

## 2022-09-12 NOTE — ASU PATIENT PROFILE, ADULT - NSICDXPASTMEDICALHX_GEN_ALL_CORE_FT
PAST MEDICAL HISTORY:  Bigeminy     Chronic back pain     Depression     Left knee pain     Mitral valve prolapse     PVCs (premature ventricular contractions) ablation    Unilateral primary osteoarthritis, right hip

## 2022-09-12 NOTE — OCCUPATIONAL THERAPY INITIAL EVALUATION ADULT - PERTINENT HX OF CURRENT PROBLEM, REHAB EVAL
62 yr old female  with hx of PVCs s/p ablation, osteoarthritis, chronic knee and back pain. Recently had back pain evaluation and was referred for work up of right hip.  Pt now s/p R REECE.

## 2022-09-12 NOTE — ASU DISCHARGE PLAN (ADULT/PEDIATRIC) - NS MD DC FALL RISK RISK
For information on Fall & Injury Prevention, visit: [email or web address] OR  [email or web address] OR  https://www.cdc.gov/steadi/patient.html

## 2022-09-12 NOTE — PHYSICAL THERAPY INITIAL EVALUATION ADULT - PERTINENT HX OF CURRENT PROBLEM, REHAB EVAL
62 yr old female  with hx of PVCs s/p ablation, osteoarthritis, chronic knee and back pain. Recently had back pain evaluation and was referred for work up of right hip. Pt is now s/p R REECE

## 2022-09-12 NOTE — ASU DISCHARGE PLAN (ADULT/PEDIATRIC) - PATIENT EDUCATION MATERIALS PROVIED
Provider pre-printed instructions given Adjacent Tissue Transfer Text: The defect edges were debeveled with a #15 scalpel blade.  Given the location of the defect and the proximity to free margins an adjacent tissue transfer was deemed most appropriate.  Using a sterile surgical marker, an appropriate flap was drawn incorporating the defect and placing the expected incisions within the relaxed skin tension lines where possible.    The area thus outlined was incised deep to adipose tissue with a #15 scalpel blade.  The skin margins were undermined to an appropriate distance in all directions utilizing iris scissors.

## 2022-09-12 NOTE — ASU PATIENT PROFILE, ADULT - FALL HARM RISK - HARM RISK INTERVENTIONS
Communicate Risk of Fall with Harm to all staff/Reinforce activity limits and safety measures with patient and family/Tailored Fall Risk Interventions/Visual Cue: Yellow wristband and red socks/Bed in lowest position, wheels locked, appropriate side rails in place/Call bell, personal items and telephone in reach/Instruct patient to call for assistance before getting out of bed or chair/Non-slip footwear when patient is out of bed/[place] to call system/Physically safe environment - no spills, clutter or unnecessary equipment/Purposeful Proactive Rounding/Room/bathroom lighting operational, light cord in reach Assistance with ambulation/Assistance OOB with selected safe patient handling equipment/Communicate Risk of Fall with Harm to all staff/Reinforce activity limits and safety measures with patient and family/Tailored Fall Risk Interventions/Visual Cue: Yellow wristband and red socks/Bed in lowest position, wheels locked, appropriate side rails in place/Call bell, personal items and telephone in reach/Instruct patient to call for assistance before getting out of bed or chair/Non-slip footwear when patient is out of bed/[place] to call system/Physically safe environment - no spills, clutter or unnecessary equipment/Purposeful Proactive Rounding/Room/bathroom lighting operational, light cord in reach

## 2022-09-12 NOTE — ASU DISCHARGE PLAN (ADULT/PEDIATRIC) - CARE PROVIDER_API CALL
Alvarado Giron)  Orthopaedic Surgery  [address], [address]  [place] [address]  Phone: [phone]  Fax: [phone]  Follow Up Time: 2 weeks

## 2022-09-12 NOTE — PHYSICAL THERAPY INITIAL EVALUATION ADULT - ORIENTATION, REHAB EVAL
05/19/21 11:09 AM     See documentation in the VB [employer] SmartForm       Thierno Doan MA oriented to person, place, time and situation

## 2022-09-17 ENCOUNTER — INPATIENT (INPATIENT)
Facility: HOSPITAL | Age: 62
LOS: 0 days | Discharge: HOME | DRG: 561 | End: 2022-09-18
Attending: ORTHOPAEDIC SURGERY | Admitting: ORTHOPAEDIC SURGERY
Payer: COMMERCIAL

## 2022-09-17 ENCOUNTER — TRANSCRIPTION ENCOUNTER (OUTPATIENT)
Age: 62
End: 2022-09-17

## 2022-09-17 VITALS
HEIGHT: 64 IN | OXYGEN SATURATION: 94 % | SYSTOLIC BLOOD PRESSURE: 198 MMHG | TEMPERATURE: 98 F | HEART RATE: 47 BPM | DIASTOLIC BLOOD PRESSURE: 88 MMHG | RESPIRATION RATE: 18 BRPM

## 2022-09-17 DIAGNOSIS — Z98.890 OTHER SPECIFIED POSTPROCEDURAL STATES: Chronic | ICD-10-CM

## 2022-09-17 PROCEDURE — 99285 EMERGENCY DEPT VISIT HI MDM: CPT

## 2022-09-17 PROCEDURE — 73552 X-RAY EXAM OF FEMUR 2/>: CPT | Mod: 26,RT

## 2022-09-17 PROCEDURE — 73502 X-RAY EXAM HIP UNI 2-3 VIEWS: CPT | Mod: 26,RT

## 2022-09-17 PROCEDURE — 93010 ELECTROCARDIOGRAM REPORT: CPT

## 2022-09-17 RX ORDER — HYDROMORPHONE HYDROCHLORIDE 2 MG/ML
0.5 INJECTION INTRAMUSCULAR; INTRAVENOUS; SUBCUTANEOUS ONCE
Refills: 0 | Status: DISCONTINUED | OUTPATIENT
Start: 2022-09-17 | End: 2022-09-17

## 2022-09-17 RX ADMIN — HYDROMORPHONE HYDROCHLORIDE 0.5 MILLIGRAM(S): 2 INJECTION INTRAMUSCULAR; INTRAVENOUS; SUBCUTANEOUS at 23:31

## 2022-09-17 NOTE — ED ADULT NURSE NOTE - NSIMPLEMENTINTERV_GEN_ALL_ED
Implemented All Fall Risk Interventions:  [place] [address] to call system. Call bell, personal items and telephone within reach. Instruct patient to call for assistance. Room bathroom lighting operational. Non-slip footwear when patient is off stretcher. Physically safe environment: no spills, clutter or unnecessary equipment. Stretcher in lowest position, wheels locked, appropriate side rails in place. Provide visual cue, wrist band, yellow gown, etc. Monitor gait and stability. Monitor for mental status changes and reorient to person, place, and time. Review medications for side effects contributing to fall risk. Reinforce activity limits and safety measures with patient and family.

## 2022-09-17 NOTE — ED ADULT NURSE NOTE - OBJECTIVE STATEMENT
61 yo female with pmh R hip replacement x 1 week presents to ED by EMS c/o R hip pain and "hip dislocation." Pt denies falls/trauma, numbness/tingling, loss of sensation, temperature changes to leg/foot. Upon assessment, pt a&ox3, breathing spontaneous and unlabored, skin warm dry and appropriate color, R leg shortened, able to move all other extremities, sensation intact, + pedal and peripheral pulses noted, no temperature changes noted. MD at bedside. Pt safety measures in place and comfort provided.

## 2022-09-17 NOTE — ED ADULT TRIAGE NOTE - MEANS OF ARRIVAL
The skin at the access site was anesthetized. Using a micropuncture needle with ultrasound (Klooff) the right basilic vein was succesfully accessed in a retrograde fashion over the guidewire using a [employer] [place] Cv 7f .[id number]. Ultrasound found the vessel was patent. A permanent recording was saved.  stretcher

## 2022-09-18 ENCOUNTER — TRANSCRIPTION ENCOUNTER (OUTPATIENT)
Age: 62
End: 2022-09-18

## 2022-09-18 VITALS — WEIGHT: 164.02 LBS | HEIGHT: 64 IN

## 2022-09-18 VITALS
HEART RATE: 68 BPM | DIASTOLIC BLOOD PRESSURE: 84 MMHG | RESPIRATION RATE: 16 BRPM | SYSTOLIC BLOOD PRESSURE: 177 MMHG | OXYGEN SATURATION: 98 %

## 2022-09-18 DIAGNOSIS — S73.004A UNSPECIFIED DISLOCATION OF RIGHT HIP, INITIAL ENCOUNTER: ICD-10-CM

## 2022-09-18 LAB
ALBUMIN SERPL ELPH-MCNC: 3.9 G/DL — SIGNIFICANT CHANGE UP (ref 3.3–5)
ALP SERPL-CCNC: 90 U/L — SIGNIFICANT CHANGE UP (ref 40–120)
ALT FLD-CCNC: 34 U/L — SIGNIFICANT CHANGE UP (ref 10–45)
ANION GAP SERPL CALC-SCNC: 13 MMOL/L — SIGNIFICANT CHANGE UP (ref 5–17)
APTT BLD: 26 SEC — LOW (ref 27.5–35.5)
AST SERPL-CCNC: 39 U/L — SIGNIFICANT CHANGE UP (ref 10–40)
BASOPHILS # BLD AUTO: 0.02 K/UL — SIGNIFICANT CHANGE UP (ref 0–0.2)
BASOPHILS NFR BLD AUTO: 0.3 % — SIGNIFICANT CHANGE UP (ref 0–2)
BILIRUB SERPL-MCNC: 0.3 MG/DL — SIGNIFICANT CHANGE UP (ref 0.2–1.2)
BLD GP AB SCN SERPL QL: NEGATIVE — SIGNIFICANT CHANGE UP
BUN SERPL-MCNC: 12 MG/DL — SIGNIFICANT CHANGE UP (ref 7–23)
CALCIUM SERPL-MCNC: 8.9 MG/DL — SIGNIFICANT CHANGE UP (ref 8.4–10.5)
CHLORIDE SERPL-SCNC: 101 MMOL/L — SIGNIFICANT CHANGE UP (ref 96–108)
CO2 SERPL-SCNC: 24 MMOL/L — SIGNIFICANT CHANGE UP (ref 22–31)
CREAT SERPL-MCNC: 0.52 MG/DL — SIGNIFICANT CHANGE UP (ref 0.5–1.3)
CRP SERPL-MCNC: 32 MG/L — HIGH (ref 0–4)
EGFR: 104 ML/MIN/1.73M2 — SIGNIFICANT CHANGE UP
EOSINOPHIL # BLD AUTO: 0.03 K/UL — SIGNIFICANT CHANGE UP (ref 0–0.5)
EOSINOPHIL NFR BLD AUTO: 0.4 % — SIGNIFICANT CHANGE UP (ref 0–6)
ERYTHROCYTE [SEDIMENTATION RATE] IN BLOOD: 47 MM/HR — HIGH (ref 0–20)
FLUAV AG NPH QL: SIGNIFICANT CHANGE UP
FLUBV AG NPH QL: SIGNIFICANT CHANGE UP
GLUCOSE SERPL-MCNC: 121 MG/DL — HIGH (ref 70–99)
HCG SERPL-ACNC: 2 MIU/ML — SIGNIFICANT CHANGE UP
HCT VFR BLD CALC: 35.8 % — SIGNIFICANT CHANGE UP (ref 34.5–45)
HGB BLD-MCNC: 11.4 G/DL — LOW (ref 11.5–15.5)
IMM GRANULOCYTES NFR BLD AUTO: 0.5 % — SIGNIFICANT CHANGE UP (ref 0–0.9)
INR BLD: 1.13 RATIO — SIGNIFICANT CHANGE UP (ref 0.88–1.16)
LYMPHOCYTES # BLD AUTO: 0.84 K/UL — LOW (ref 1–3.3)
LYMPHOCYTES # BLD AUTO: 11.2 % — LOW (ref 13–44)
MCHC RBC-ENTMCNC: 29.3 PG — SIGNIFICANT CHANGE UP (ref 27–34)
MCHC RBC-ENTMCNC: 31.8 GM/DL — LOW (ref 32–36)
MCV RBC AUTO: 92 FL — SIGNIFICANT CHANGE UP (ref 80–100)
MONOCYTES # BLD AUTO: 0.51 K/UL — SIGNIFICANT CHANGE UP (ref 0–0.9)
MONOCYTES NFR BLD AUTO: 6.8 % — SIGNIFICANT CHANGE UP (ref 2–14)
NEUTROPHILS # BLD AUTO: 6.03 K/UL — SIGNIFICANT CHANGE UP (ref 1.8–7.4)
NEUTROPHILS NFR BLD AUTO: 80.8 % — HIGH (ref 43–77)
NRBC # BLD: 0 /100 WBCS — SIGNIFICANT CHANGE UP (ref 0–0)
PLATELET # BLD AUTO: 211 K/UL — SIGNIFICANT CHANGE UP (ref 150–400)
POTASSIUM SERPL-MCNC: 3.6 MMOL/L — SIGNIFICANT CHANGE UP (ref 3.5–5.3)
POTASSIUM SERPL-SCNC: 3.6 MMOL/L — SIGNIFICANT CHANGE UP (ref 3.5–5.3)
PROCALCITONIN SERPL-MCNC: <0.03 NG/ML — SIGNIFICANT CHANGE UP (ref 0.02–0.1)
PROT SERPL-MCNC: 6.9 G/DL — SIGNIFICANT CHANGE UP (ref 6–8.3)
PROTHROM AB SERPL-ACNC: 13.1 SEC — SIGNIFICANT CHANGE UP (ref 10.5–13.4)
RBC # BLD: 3.89 M/UL — SIGNIFICANT CHANGE UP (ref 3.8–5.2)
RBC # FLD: 13.7 % — SIGNIFICANT CHANGE UP (ref 10.3–14.5)
RH IG SCN BLD-IMP: NEGATIVE — SIGNIFICANT CHANGE UP
RSV RNA NPH QL NAA+NON-PROBE: SIGNIFICANT CHANGE UP
SARS-COV-2 RNA SPEC QL NAA+PROBE: SIGNIFICANT CHANGE UP
SODIUM SERPL-SCNC: 138 MMOL/L — SIGNIFICANT CHANGE UP (ref 135–145)
WBC # BLD: 7.47 K/UL — SIGNIFICANT CHANGE UP (ref 3.8–10.5)
WBC # FLD AUTO: 7.47 K/UL — SIGNIFICANT CHANGE UP (ref 3.8–10.5)

## 2022-09-18 PROCEDURE — 85610 PROTHROMBIN TIME: CPT

## 2022-09-18 PROCEDURE — 73502 X-RAY EXAM HIP UNI 2-3 VIEWS: CPT | Mod: 26,RT

## 2022-09-18 PROCEDURE — 71045 X-RAY EXAM CHEST 1 VIEW: CPT

## 2022-09-18 PROCEDURE — 71045 X-RAY EXAM CHEST 1 VIEW: CPT | Mod: 26

## 2022-09-18 PROCEDURE — 36415 COLL VENOUS BLD VENIPUNCTURE: CPT

## 2022-09-18 PROCEDURE — 84145 PROCALCITONIN (PCT): CPT

## 2022-09-18 PROCEDURE — 87637 SARSCOV2&INF A&B&RSV AMP PRB: CPT

## 2022-09-18 PROCEDURE — 27266 TREAT HIP DISLOCATION: CPT | Mod: 78,RT

## 2022-09-18 PROCEDURE — 99285 EMERGENCY DEPT VISIT HI MDM: CPT

## 2022-09-18 PROCEDURE — 85025 COMPLETE CBC W/AUTO DIFF WBC: CPT

## 2022-09-18 PROCEDURE — 86901 BLOOD TYPING SEROLOGIC RH(D): CPT

## 2022-09-18 PROCEDURE — 73552 X-RAY EXAM OF FEMUR 2/>: CPT

## 2022-09-18 PROCEDURE — 86850 RBC ANTIBODY SCREEN: CPT

## 2022-09-18 PROCEDURE — 83735 ASSAY OF MAGNESIUM: CPT

## 2022-09-18 PROCEDURE — 80053 COMPREHEN METABOLIC PANEL: CPT

## 2022-09-18 PROCEDURE — 85730 THROMBOPLASTIN TIME PARTIAL: CPT

## 2022-09-18 PROCEDURE — 86900 BLOOD TYPING SEROLOGIC ABO: CPT

## 2022-09-18 PROCEDURE — 85652 RBC SED RATE AUTOMATED: CPT

## 2022-09-18 PROCEDURE — 86140 C-REACTIVE PROTEIN: CPT

## 2022-09-18 PROCEDURE — 73502 X-RAY EXAM HIP UNI 2-3 VIEWS: CPT

## 2022-09-18 PROCEDURE — 84702 CHORIONIC GONADOTROPIN TEST: CPT

## 2022-09-18 RX ORDER — HYDROMORPHONE HYDROCHLORIDE 2 MG/ML
0.5 INJECTION INTRAMUSCULAR; INTRAVENOUS; SUBCUTANEOUS ONCE
Refills: 0 | Status: DISCONTINUED | OUTPATIENT
Start: 2022-09-18 | End: 2022-09-18

## 2022-09-18 RX ORDER — ASPIRIN/CALCIUM CARB/MAGNESIUM 324 MG
1 TABLET ORAL
Qty: 60 | Refills: 0
Start: 2022-09-18 | End: 2022-10-17

## 2022-09-18 RX ORDER — KETOROLAC TROMETHAMINE 30 MG/ML
30 SYRINGE (ML) INJECTION ONCE
Refills: 0 | Status: DISCONTINUED | OUTPATIENT
Start: 2022-09-18 | End: 2022-09-18

## 2022-09-18 RX ORDER — SODIUM CHLORIDE 9 MG/ML
1000 INJECTION, SOLUTION INTRAVENOUS
Refills: 0 | Status: DISCONTINUED | OUTPATIENT
Start: 2022-09-18 | End: 2022-09-18

## 2022-09-18 RX ORDER — ONDANSETRON 8 MG/1
4 TABLET, FILM COATED ORAL ONCE
Refills: 0 | Status: COMPLETED | OUTPATIENT
Start: 2022-09-18 | End: 2022-09-18

## 2022-09-18 RX ORDER — ACETAMINOPHEN 500 MG
1000 TABLET ORAL ONCE
Refills: 0 | Status: COMPLETED | OUTPATIENT
Start: 2022-09-18 | End: 2022-09-18

## 2022-09-18 RX ORDER — FENTANYL CITRATE 50 UG/ML
50 INJECTION INTRAVENOUS ONCE
Refills: 0 | Status: DISCONTINUED | OUTPATIENT
Start: 2022-09-18 | End: 2022-09-18

## 2022-09-18 RX ORDER — FENTANYL CITRATE 50 UG/ML
25 INJECTION INTRAVENOUS
Refills: 0 | Status: DISCONTINUED | OUTPATIENT
Start: 2022-09-18 | End: 2022-09-18

## 2022-09-18 RX ORDER — HYDROMORPHONE HYDROCHLORIDE 2 MG/ML
0.5 INJECTION INTRAMUSCULAR; INTRAVENOUS; SUBCUTANEOUS
Refills: 0 | Status: DISCONTINUED | OUTPATIENT
Start: 2022-09-18 | End: 2022-09-18

## 2022-09-18 RX ADMIN — FENTANYL CITRATE 50 MICROGRAM(S): 50 INJECTION INTRAVENOUS at 00:37

## 2022-09-18 RX ADMIN — HYDROMORPHONE HYDROCHLORIDE 0.5 MILLIGRAM(S): 2 INJECTION INTRAMUSCULAR; INTRAVENOUS; SUBCUTANEOUS at 02:21

## 2022-09-18 RX ADMIN — SODIUM CHLORIDE 150 MILLILITER(S): 9 INJECTION, SOLUTION INTRAVENOUS at 07:38

## 2022-09-18 RX ADMIN — HYDROMORPHONE HYDROCHLORIDE 0.5 MILLIGRAM(S): 2 INJECTION INTRAMUSCULAR; INTRAVENOUS; SUBCUTANEOUS at 05:20

## 2022-09-18 RX ADMIN — Medication 400 MILLIGRAM(S): at 00:39

## 2022-09-18 RX ADMIN — HYDROMORPHONE HYDROCHLORIDE 0.5 MILLIGRAM(S): 2 INJECTION INTRAMUSCULAR; INTRAVENOUS; SUBCUTANEOUS at 08:37

## 2022-09-18 RX ADMIN — Medication 30 MILLIGRAM(S): at 08:37

## 2022-09-18 RX ADMIN — ONDANSETRON 4 MILLIGRAM(S): 8 TABLET, FILM COATED ORAL at 05:17

## 2022-09-18 RX ADMIN — Medication 30 MILLIGRAM(S): at 05:17

## 2022-09-18 RX ADMIN — Medication 400 MILLIGRAM(S): at 07:36

## 2022-09-18 RX ADMIN — ONDANSETRON 4 MILLIGRAM(S): 8 TABLET, FILM COATED ORAL at 00:36

## 2022-09-18 RX ADMIN — HYDROMORPHONE HYDROCHLORIDE 0.5 MILLIGRAM(S): 2 INJECTION INTRAMUSCULAR; INTRAVENOUS; SUBCUTANEOUS at 00:03

## 2022-09-18 RX ADMIN — FENTANYL CITRATE 50 MICROGRAM(S): 50 INJECTION INTRAVENOUS at 08:37

## 2022-09-18 NOTE — ED PROVIDER NOTE - OBJECTIVE STATEMENT
63yo F hx of frequent PVCs s/p ablation, osteoarthritis, R hip replacement 5 days ago w/ Dr. Giron here for hip pain. Pt sitting today, went to move her R leg to take off shoe with other leg and felt severe R hip pain. Denies fevers or other medical complaints.

## 2022-09-18 NOTE — ED PROVIDER NOTE - ATTENDING CONTRIBUTION TO CARE
R hip replacement five days ago (Danbasilio) this evening performing routine movement of RLE and immediate pain/deformity/unable to move, took oxycodone 10mg without relief, here on exam RLE shortened and held in adduction, neurovascularly intact.  Suspect dislocation of prosthetic hip, will treat pain, plain films, consult orthopedics.

## 2022-09-18 NOTE — ED PROVIDER NOTE - PROGRESS NOTE DETAILS
ED sign out, eval for R hip tauma, pending imaging and close reassessments -- Ken Salmon MD CHAPIN Karimi (PGY-3) - pt w/ dislocated hip. Pending ortho recs for reduction. remained stable, ED Sign Out pending Anesthsia and Ortho for admit/OR -vs- anesthesia sedation in ED for tx / dispo decisions (not ED sedation candidate as cardiac complications/rhythm) -- Ken Salmon MD CHAPIN Karimi (PGY-3) - d/w ortho. Admit to Dr. Giron.

## 2022-09-18 NOTE — ASU DISCHARGE PLAN (ADULT/PEDIATRIC) - CARE PROVIDER_API CALL
Alvarado Giron)  Orthopaedic Surgery  [address], [address]  [place] [address]  Phone: [phone]  Fax: [phone]  Follow Up Time:

## 2022-09-18 NOTE — H&P ADULT - HISTORY OF PRESENT ILLNESS
Orthopedics    Patient is a 62yFemale  who presents to [facility] ED w/ a c/o of R hip pain s/p  R REECE 9/12/22 with Dr Goodson. Patient states she was taking off her shoes when the hip dislocated, denies preceding CP/SOB/palpitations/N/v/Headache/confusion/dizziness/weakness/fatigue. Denies falls. Denies any numbness or tingling. Denies having any other pain elsewhere. No other orthopedic concerns at this time.    Mitral valve prolapse    Depression    Chronic back pain    Left knee pain    Unilateral primary osteoarthritis, right hip    PVCs (premature ventricular contractions)    Bigeminy            No Known Allergies      PHYSICAL EXAM:  T(C): 36.5 (09-18-22 @ 00:35), Max: 36.6 (09-17-22 @ 22:38)  HR: 70 (09-18-22 @ 07:21) (47 - 88)  BP: 145/78 (09-18-22 @ 07:21) (145/72 - 204/110)  RR: 18 (09-18-22 @ 07:21) (12 - 18)  SpO2: 96% (09-18-22 @ 07:21) (94% - 98%)    Gen: NAD, Resting comfortably    RLE:  Leg externally rotated, Surgical dressing intact/clean. no erythema or ecchymosis  +tenderness to palpation anterior groin  +EHL/FHL/TA/GSC  +SILT L3-S1  + DP  Compartments soft and compressible  No calf tenderness    Secondary Survey:   No TTP over bony prominences, SILT, palpable pulses, full/painless A/PROM, compartments soft. No TTP over spinous processes or paraspinal muscles at C/T/L spine. No palpable step off. No other injuries or complaints.      A/P: 62F w/ R REECE dislocation    Plan:    -Plan for Closed reduction R Hip in OR , will book and begin preop.  -Preop labs/imaging:   -NPO except/IVFs while NPO.  -bedrest  -Hold chem DVT ppx  -Pain control prn  -Medical management, continue home meds.  -Case discussed with attending, will advise if plan changes.

## 2022-09-18 NOTE — H&P ADULT - ATTENDING COMMENTS
I agree with the above note and have personally seen and examined this patient. All pertinent films have been reviewed. Please refer to clinical documentation of the history, physical examinations, data summary, and both assessment and plan as documented above and with which I agree.    patient dislocated r tamanna overnight 6 days s/p r tamanna  was sitting on chair externally rotating while pulling up on leg  came to ed overnight  due to pvc ed refused conscious sedation  to or this mornign for r tamanna closed reduction under anesthesia  consent in chart  npo  holding dvtp    Alvarado Giron MD  Attending Orthopedic Surgeon

## 2022-09-18 NOTE — BRIEF OPERATIVE NOTE - NSICDXBRIEFPOSTOP_GEN_ALL_CORE_FT
POST-OP DIAGNOSIS:  S/P closed reduction of dislocated total hip prosthesis 18-Sep-2022 09:51:15  Tim Awan

## 2022-09-18 NOTE — ED ADULT NURSE REASSESSMENT NOTE - NS ED NURSE REASSESS COMMENT FT1
Pt placed on primafit, updated on plan of care, awaiting dispo. Pt maintained on continuous pulse ox and cardiac monitor. Safety and comfort measures maintained- bed in lowest position, locked, and blanket given.

## 2022-09-18 NOTE — ASU DISCHARGE PLAN (ADULT/PEDIATRIC) - ASU DC SPECIAL INSTRUCTIONSFT
FOLLOW UP: Please follow up with your surgeon as planned 2 weeks after initial surgery.  Call to make appointment  DIET: You may resume your regular diet. Narcotic pain medicine can cause extreme nausea and constipation. Drink plenty of water and take diuretics (colace, Miralax) as needed. You can get them from your local pharmacy.  WOUND CARE:  Please keep incisions clean and dry. Please do not scrub or rub incisions. Do not use lotion or powder on incisions. Do not remove bandage until 9/26  BATHING: Keep your surgical area clean and dry. You may shower or use sponge bath to bathe.  Do not submerge wound underwater.   PAIN CONTROL:  Alternate between taking Ibuprofen and Tylenol so you are taking pain medication every 3 to 4 hours.  It is important to ice and elevate your leg to keep swelling down and the pain manageable. Keep the ice on for 20 minutes, and then keep off for 20 minutes. Repeat while awake.  MEDICATIONS: Take all medications as prescribed. It is important to take your blood thinner medication to prevent dangerous blood clots (deep vein thrombosis). Please continue taking your aspirin 81mg 2x a day   ACTIVITY: No heavy lifting or straining for 2 weeks. Otherwise, you may return to your usual level of physical activity.   NOTIFY YOUR SURGEON IF: You have any bleeding that does not stop, any pus draining from your wound(s), any fever (over 100.4 F) or chills, persistent nausea/vomiting, persistent diarrhea, or if your pain is not controlled on your discharge pain medications. FOLLOW UP: Please follow up with your surgeon as planned 2 weeks after initial surgery.  Call to make appointment  DIET: You may resume your regular diet. Narcotic pain medicine can cause extreme nausea and constipation. Drink plenty of water and take diuretics (colace, Miralax) as needed. You can get them from your local pharmacy.  WOUND CARE:  Please keep incisions clean and dry. Please do not scrub or rub incisions. Do not use lotion or powder on incisions. Do not remove bandage until 9/26  BATHING: Keep your surgical area clean and dry. You may shower or use sponge bath to bathe.  Do not submerge wound underwater.   PAIN CONTROL:  Alternate between taking Ibuprofen and Tylenol so you are taking pain medication every 3 to 4 hours.  It is important to ice and elevate your leg to keep swelling down and the pain manageable. Keep the ice on for 20 minutes, and then keep off for 20 minutes. Repeat while awake.  MEDICATIONS: Take all medications as prescribed. It is important to take your blood thinner medication to prevent dangerous blood clots (deep vein thrombosis). Please continue taking your aspirin 81mg 2x a day   ACTIVITY: No heavy lifting or straining for 2 weeks. Otherwise, you may return to your usual level of physical activity. Please follow the below precautions:  - Do not cross your legs   - Avoid the combined movement of bending your hip and turning in your foot  - Avoid bending your hip past a right angle  - Avoid low chairs  - Avoid bending over to pick things up.   NOTIFY YOUR SURGEON IF: You have any bleeding that does not stop, any pus draining from your wound(s), any fever (over 100.4 F) or chills, persistent nausea/vomiting, persistent diarrhea, or if your pain is not controlled on your discharge pain medications. FOLLOW UP: Please follow up with your surgeon as planned 2 weeks after initial surgery.  Call to make appointment    DIET: You may resume your regular diet. Narcotic pain medicine can cause extreme nausea and constipation. Drink plenty of water and take diuretics (colace, Miralax) as needed. You can get them from your local pharmacy.    WOUND CARE:  Please keep incisions clean and dry. Please do not scrub or rub incisions. Do not use lotion or powder on incisions. Do not remove bandage until 9/26    BATHING: Keep your surgical area clean and dry. You may shower or use sponge bath to bathe.  Do not submerge wound underwater.     PAIN CONTROL:  Alternate between taking Ibuprofen and Tylenol so you are taking pain medication every 3 to 4 hours.  It is important to ice and elevate your leg to keep swelling down and the pain manageable. Keep the ice on for 20 minutes, and then keep off for 20 minutes. Repeat while awake.    MEDICATIONS: Take all medications as prescribed. It is important to take your blood thinner medication   to prevent dangerous blood clots (deep vein thrombosis). Please continue taking your aspirin 81mg 2x a day     ACTIVITY: No heavy lifting or straining for 2 weeks. Otherwise, you may return to your usual level of physical activity. Please follow the below precautions:  - Do not cross your legs   - Avoid the combined movement of bending your hip and turning in your foot  - Avoid bending your hip past a right angle  - Avoid low chairs  - Avoid bending over to pick things up.     NOTIFY YOUR SURGEON IF: You have any bleeding that does not stop, any pus draining from your wound(s), any fever (over 100.4 F) or chills, persistent nausea/vomiting, persistent diarrhea, or if your pain is not controlled on your discharge pain medications.

## 2022-09-18 NOTE — ED PROVIDER NOTE - PHYSICAL EXAMINATION
General: NAD  HEENT: NCAT  Cardiac: RRR, 2+ radial pulses  Chest: CTA  Abdomen: soft, non-distended, no ttp, no rebound or guarding  Extremities: + R hip tenderness  Skin: no rashes  Neuro: AAOx3, motor and sensory grossly intact  Psych: mood and affect appropriate

## 2022-09-18 NOTE — ASU DISCHARGE PLAN (ADULT/PEDIATRIC) - NS MD DC FALL RISK RISK
For information on Fall & Injury Prevention, visit: [email or web address] OR  [email or web address] OR  https://www.cdc.gov/steadi/patient.html

## 2022-09-18 NOTE — ED PROVIDER NOTE - CLINICAL SUMMARY MEDICAL DECISION MAKING FREE TEXT BOX
Pt 5 days out from total hip on R, concern for dislocation. VSS. Will obtain XRs, labs, analgesia. Ortho consult

## 2022-09-18 NOTE — PRE-ANESTHESIA EVALUATION ADULT - NSANTHOSAYNRD_GEN_A_CORE
No. NADEEM screening performed.  STOP BANG Legend: 0-2 = LOW Risk; 3-4 = INTERMEDIATE Risk; 5-8 = HIGH Risk
No. NADEEM screening performed.  STOP BANG Legend: 0-2 = LOW Risk; 3-4 = INTERMEDIATE Risk; 5-8 = HIGH Risk

## 2022-09-18 NOTE — BRIEF OPERATIVE NOTE - NSICDXBRIEFPREOP_GEN_ALL_CORE_FT
PRE-OP DIAGNOSIS:  S/P closed reduction of dislocated total hip prosthesis 18-Sep-2022 09:51:09  Tim Awan

## 2022-09-27 ENCOUNTER — APPOINTMENT (OUTPATIENT)
Dept: ORTHOPEDIC SURGERY | Facility: CLINIC | Age: 62
End: 2022-09-27

## 2022-09-27 PROCEDURE — 73502 X-RAY EXAM HIP UNI 2-3 VIEWS: CPT | Mod: 26,RT

## 2022-09-27 PROCEDURE — 99024 POSTOP FOLLOW-UP VISIT: CPT

## 2022-09-27 NOTE — DISCUSSION/SUMMARY
[de-identified] : The patient is doing well after joint replacement surgery. Written infectious precautions were reviewed. The patient will progress with physical therapy at this time and they will work on transitioning from requiring assistive devices for ambulation. Anti-coagulant therapy will be discontinued at 1 month post surgery for the purpose of orthopedic thromboembolism prophylaxis. Return around the 6 week anniversary from surgery for follow-up evaluation.

## 2022-09-27 NOTE — HISTORY OF PRESENT ILLNESS
[de-identified] : Status-post right total hip  arthroplasty here for initial postoperative evaluation. She dislocated approx. 5 days post-op but she is doing well today. She is hesitant to move her hip out of fear.

## 2022-09-27 NOTE — PHYSICAL EXAM
[de-identified] : Well developed, well nourished in no apparent distress, awake, alert and orientated to person, place and time with appropriate mood and affect\par Respirations are even and unlabored. Gait evaluation does not reveal a limp. There is no inguinal adenopathy. The affected limb is well-perfused with palpable pedal pulse, without skin lesions, shows a grossly normal motor and sensory examination. Incision is healed Hip motion is full and painless throughout ROM. Leg lengths are approximately equal  [de-identified] : AP pelvis, AP hip, and lateral x-rays of the right hip were ordered and obtained in the office and demonstrate satisfactory position and alignment of the components are present. No signs of loosening are seen.

## 2022-10-18 ENCOUNTER — RX RENEWAL (OUTPATIENT)
Age: 62
End: 2022-10-18

## 2022-10-18 RX ORDER — CELECOXIB 100 MG/1
100 CAPSULE ORAL
Qty: 60 | Refills: 2 | Status: ACTIVE | COMMUNITY
Start: 2022-07-20 | End: 1900-01-01

## 2022-10-27 ENCOUNTER — APPOINTMENT (OUTPATIENT)
Dept: ORTHOPEDIC SURGERY | Facility: CLINIC | Age: 62
End: 2022-10-27

## 2022-10-27 VITALS
BODY MASS INDEX: 28.17 KG/M2 | HEIGHT: 64 IN | SYSTOLIC BLOOD PRESSURE: 154 MMHG | WEIGHT: 165 LBS | DIASTOLIC BLOOD PRESSURE: 73 MMHG

## 2022-10-27 VITALS — HEART RATE: 36 BPM

## 2022-10-27 DIAGNOSIS — M87.051 IDIOPATHIC ASEPTIC NECROSIS OF RIGHT FEMUR: ICD-10-CM

## 2022-10-27 DIAGNOSIS — M25.551 PAIN IN RIGHT HIP: ICD-10-CM

## 2022-10-27 PROCEDURE — 99024 POSTOP FOLLOW-UP VISIT: CPT

## 2022-10-27 NOTE — HISTORY OF PRESENT ILLNESS
[de-identified] : Status-post right total hip  arthroplasty here for routine postoperative evaluation. She dislocated approx. 5 days post-op but she is doing well today. No instability since. No pain. Ambulating without a cane or walker.

## 2022-10-27 NOTE — DISCUSSION/SUMMARY
[de-identified] : The patient is doing well after joint replacement surgery. WBAT. Return around the 6 month anniversary from surgery for follow-up evaluation.

## 2022-10-27 NOTE — PHYSICAL EXAM
[de-identified] : Well developed, well nourished in no apparent distress, awake, alert and orientated to person, place and time with appropriate mood and affect\par Respirations are even and unlabored. Gait evaluation does not reveal a limp. There is no inguinal adenopathy. The affected limb is well-perfused with palpable pedal pulse, without skin lesions, shows a grossly normal motor and sensory examination. Incision is healed Hip motion is full and painless throughout ROM. Leg lengths are approximately equal

## 2022-11-01 ENCOUNTER — NON-APPOINTMENT (OUTPATIENT)
Age: 62
End: 2022-11-01

## 2022-12-02 ENCOUNTER — APPOINTMENT (OUTPATIENT)
Dept: ORTHOPEDIC SURGERY | Facility: CLINIC | Age: 62
End: 2022-12-02
Payer: COMMERCIAL

## 2022-12-02 VITALS — WEIGHT: 162 LBS | HEIGHT: 64 IN | BODY MASS INDEX: 27.66 KG/M2

## 2022-12-02 PROCEDURE — 99214 OFFICE O/P EST MOD 30 MIN: CPT | Mod: 24,25

## 2022-12-02 PROCEDURE — 20610 DRAIN/INJ JOINT/BURSA W/O US: CPT | Mod: 79,LT

## 2022-12-02 PROCEDURE — 73564 X-RAY EXAM KNEE 4 OR MORE: CPT | Mod: LT

## 2022-12-02 RX ORDER — HYALURONATE SODIUM 20 MG/2 ML
20 SYRINGE (ML) INTRAARTICULAR DAILY
Qty: 1 | Refills: 0 | Status: ACTIVE | COMMUNITY
Start: 2022-12-02

## 2022-12-02 NOTE — HISTORY OF PRESENT ILLNESS
[de-identified] : This is a very nice  62 year old  female experiencing  pain in the left knee which is moderate in intensity and has been going on for at least 3 months now. The pain somewhat limits activities of daily living. Walking tolerance is slightly reduced. The patient denies any radiation of the pain to the feet and it is not associated with numbness, tingling, or weakness.  We have tried both cortisone and gel injections.  They both worked well.  The last one was done in February 2022.

## 2022-12-02 NOTE — DISCUSSION/SUMMARY
[de-identified] : The patient has left knee osteoarthritis. They are not an appropriate candidate for surgical intervention at this time. An extensive discussion was conducted on the natural history of the disease and the variety of surgical and non-surgical options available to the patient including, but not limited to non-steroidal anti-inflammatory medications, steroid injections, physical therapy, maintenance of ideal body weight, and reduction of activity. I recommended a prescription of Mobic but the patient would prefer to use OTC NSAIDs at this time. The patient will schedule an appointment as needed. \par \par Informed consent for the left knee injection was obtained. All questions were answered. A time out was performed. The  knee was prepped and draped in sterile fashion. Using sterile technique, 80mg of Kenalog, 4cc of 1% lidocaine, 4cc of 0.25% marcaine using a 21-gauge needle. A sterile dressing was applied. Post injection instructions were reviewed. The patient tolerated the procedure well. \par \par We did a steroid injection today and we will apply for authorization for hyaluronic acid for her left knee in the future.

## 2022-12-02 NOTE — PHYSICAL EXAM
[de-identified] : Well developed, well nourished in no apparent distress, awake, alert and orientated to person, place and time. with appropriate mood and affect. \par Respirations are even and unlabored. Gait evaluation does reveal a limp. There is no inguinal adenopathy. \par The affected limb is well-perfused, without skin lesions, shows a grossly normal motor and sensory examination. \par Knee motion does cause pain. ROM of the knee is 0-120 degrees.  5 degrees varus\par The knee is stable within that range-of-motion to AP and ML stress. \par Muscle strength is normal. Pedal pulses are palpable.  [de-identified] : Long standing  knee, AP knee, lateral knee, tunnel and patellar views of the left knee were ordered and taken in the office and demonstrate degenerative joint disease of the knee with joint space narrowing, osteophyte formation, and subchondral sclerosis.

## 2022-12-04 ENCOUNTER — FORM ENCOUNTER (OUTPATIENT)
Age: 62
End: 2022-12-04

## 2023-03-03 ENCOUNTER — APPOINTMENT (OUTPATIENT)
Dept: ORTHOPEDIC SURGERY | Facility: CLINIC | Age: 63
End: 2023-03-03
Payer: COMMERCIAL

## 2023-03-03 VITALS — HEIGHT: 63 IN | BODY MASS INDEX: 27.11 KG/M2 | WEIGHT: 153 LBS | TEMPERATURE: 97.9 F

## 2023-03-03 DIAGNOSIS — Z96.641 PRESENCE OF RIGHT ARTIFICIAL HIP JOINT: ICD-10-CM

## 2023-03-03 PROCEDURE — 73502 X-RAY EXAM HIP UNI 2-3 VIEWS: CPT

## 2023-03-03 PROCEDURE — 99213 OFFICE O/P EST LOW 20 MIN: CPT

## 2023-03-03 NOTE — PHYSICAL EXAM
[de-identified] : Patient is well nourished, well-developed, in no acute distress, with appropriate mood and affect. The patient is oriented to time, place, and person. Respirations are even and unlabored. Gait evaluation does not reveal a limp. There is no inguinal adenopathy. Examination of the contralateral hip shows normal range of motion, strength, no tenderness, and intact skin. The affected limb is well-perfused and showed 2+ dp/pt pulses, without skin lesions, shows a grossly normal motor and sensory examination. Examination of the hip shows a well healed surgical scar. Hip motion is full and painless from 0-90 degrees extension to flexion, 20 degrees adduction and 20 degrees abduction, and 15 degrees internal and 30 degrees external rotation. Leg lengths are approximately equal. Both hips are stable and muscle strength is normal with good strength with resisted abduction and adduction. Pedal pulses are palpable. [de-identified] : AP pelvis, AP hip, and lateral x-rays of the right hip were ordered and obtained in the office and demonstrate satisfactory position and alignment of the components are present. No signs of loosening are seen.

## 2023-03-03 NOTE — HISTORY OF PRESENT ILLNESS
[de-identified] : This is very nice 62-year-old female here for interim evaluation of right total hip arthroplasty. The patient reports good pain relief since surgery in the replaced joint and satisfactory restoration of function in terms of activities of daily living. The patient no longer requires an assistive device for ambulation, does not require pain medication, and completed postoperative physical therapy. They report unlimited activities of daily living and unlimited walking tolerance. The patient is thrilled with their progress from surgery and ultimate outcome. \par

## 2023-03-03 NOTE — DISCUSSION/SUMMARY
[de-identified] : The patient is doing well after right total hip arthroplasty. Continue to be weight bearing as tolerated without restriction. Daily home exercise program recommended. Follow up is recommended in 1 year.

## 2023-05-17 RX ORDER — HYALURONATE SODIUM 20 MG/2 ML
20 SYRINGE (ML) INTRAARTICULAR DAILY
Qty: 1 | Refills: 0 | Status: ACTIVE | COMMUNITY
Start: 2023-05-17

## 2023-06-20 ENCOUNTER — APPOINTMENT (OUTPATIENT)
Dept: ORTHOPEDIC SURGERY | Facility: CLINIC | Age: 63
End: 2023-06-20

## 2023-07-07 ENCOUNTER — APPOINTMENT (OUTPATIENT)
Dept: ORTHOPEDIC SURGERY | Facility: CLINIC | Age: 63
End: 2023-07-07
Payer: COMMERCIAL

## 2023-07-07 VITALS
SYSTOLIC BLOOD PRESSURE: 173 MMHG | BODY MASS INDEX: 29.23 KG/M2 | TEMPERATURE: 97.2 F | WEIGHT: 165 LBS | DIASTOLIC BLOOD PRESSURE: 86 MMHG | HEART RATE: 36 BPM | HEIGHT: 63 IN

## 2023-07-07 PROCEDURE — 20610 DRAIN/INJ JOINT/BURSA W/O US: CPT | Mod: LT

## 2023-07-07 NOTE — PROCEDURE
[de-identified] : Injection: left knee joint. \par \par Indication: Osteoarthritis. \par \par A discussion was had with the patient regarding this procedure and all questions were answered. All risks, benefits and alternatives were discussed. These included but were not limited to bleeding, infection, and allergic reaction. Alcohol was used to clean the skin, and betadine was used to sterilize and prep the area in the anterior-medial aspect of the knee. Ethyl chloride spray was then used as a topical anesthetic. A 22-gauge needle was used to inject 2 cc of Euflexxa into the knee with ease. A sterile bandage was then applied. The patient tolerated the procedure well and there were no complications. \par \par Lot #:  [id number]\par Exp: \par \par The INJECTION #  one  of three Euflexxa injections was given today under sterile conditions into the left  knee joint without complication (see procedure note). I again discussed the role of activity modification/icing following the injection to treat any local irritation from the injection. \par \par

## 2023-07-14 ENCOUNTER — APPOINTMENT (OUTPATIENT)
Dept: ORTHOPEDIC SURGERY | Facility: CLINIC | Age: 63
End: 2023-07-14
Payer: COMMERCIAL

## 2023-07-14 VITALS
BODY MASS INDEX: 29.23 KG/M2 | HEIGHT: 63 IN | HEART RATE: 37 BPM | SYSTOLIC BLOOD PRESSURE: 165 MMHG | WEIGHT: 165 LBS | DIASTOLIC BLOOD PRESSURE: 81 MMHG | TEMPERATURE: 97.5 F

## 2023-07-14 PROCEDURE — 20610 DRAIN/INJ JOINT/BURSA W/O US: CPT | Mod: LT

## 2023-07-14 NOTE — PROCEDURE
[de-identified] : Injection: left knee joint. \par \par Indication: Osteoarthritis. \par \par A discussion was had with the patient regarding this procedure and all questions were answered. All risks, benefits and alternatives were discussed. These included but were not limited to bleeding, infection, and allergic reaction. Alcohol was used to clean the skin, and betadine was used to sterilize and prep the area in the anterior-medial aspect of the knee. Ethyl chloride spray was then used as a topical anesthetic. A 22-gauge needle was used to inject 2 cc of Euflexxa into the knee with ease. A sterile bandage was then applied. The patient tolerated the procedure well and there were no complications. \par \par Lot #:  [id number]\par Exp: \par \par The INJECTION #  two  of three Euflexxa injections was given today under sterile conditions into the left  knee joint without complication (see procedure note). I again discussed the role of activity modification/icing following the injection to treat any local irritation from the injection. \par \par

## 2023-07-21 ENCOUNTER — APPOINTMENT (OUTPATIENT)
Dept: ORTHOPEDIC SURGERY | Facility: CLINIC | Age: 63
End: 2023-07-21
Payer: COMMERCIAL

## 2023-07-21 VITALS — TEMPERATURE: 97.3 F | WEIGHT: 165 LBS | HEIGHT: 63 IN | BODY MASS INDEX: 29.23 KG/M2

## 2023-07-21 DIAGNOSIS — M17.12 UNILATERAL PRIMARY OSTEOARTHRITIS, LEFT KNEE: ICD-10-CM

## 2023-07-21 PROCEDURE — 20610 DRAIN/INJ JOINT/BURSA W/O US: CPT | Mod: LT

## 2023-07-21 NOTE — PROCEDURE
[de-identified] : Injection: left knee joint. \par \par Indication: Osteoarthritis. \par \par A discussion was had with the patient regarding this procedure and all questions were answered. All risks, benefits and alternatives were discussed. These included but were not limited to bleeding, infection, and allergic reaction. Alcohol was used to clean the skin, and betadine was used to sterilize and prep the area in the anterior-medial aspect of the knee. Ethyl chloride spray was then used as a topical anesthetic. A 22-gauge needle was used to inject 2 cc of Euflexxa into the knee with ease. A sterile bandage was then applied. The patient tolerated the procedure well and there were no complications. \par \par Lot #:  [id number]\par Exp: \par \par The INJECTION #  three  of three Euflexxa injections was given today under sterile conditions into the left  knee joint without complication (see procedure note). I again discussed the role of activity modification/icing following the injection to treat any local irritation from the injection. \par \par

## 2023-08-29 NOTE — H&P PST ADULT - PROBLEM SELECTOR PLAN 1
Quality 110: Preventive Care And Screening: Influenza Immunization: Influenza Immunization Administered during Influenza season Detail Level: Detailed right total hip arthroplasty  direct anterior approach

## 2024-09-13 ENCOUNTER — APPOINTMENT (OUTPATIENT)
Dept: ORTHOPEDIC SURGERY | Facility: CLINIC | Age: 64
End: 2024-09-13
Payer: COMMERCIAL

## 2024-09-13 VITALS — WEIGHT: 179 LBS | HEIGHT: 63 IN | BODY MASS INDEX: 31.71 KG/M2

## 2024-09-13 DIAGNOSIS — I10 ESSENTIAL (PRIMARY) HYPERTENSION: ICD-10-CM

## 2024-09-13 DIAGNOSIS — M17.11 UNILATERAL PRIMARY OSTEOARTHRITIS, RIGHT KNEE: ICD-10-CM

## 2024-09-13 DIAGNOSIS — M17.12 UNILATERAL PRIMARY OSTEOARTHRITIS, LEFT KNEE: ICD-10-CM

## 2024-09-13 DIAGNOSIS — Z87.891 PERSONAL HISTORY OF NICOTINE DEPENDENCE: ICD-10-CM

## 2024-09-13 PROCEDURE — 99204 OFFICE O/P NEW MOD 45 MIN: CPT

## 2024-09-13 PROCEDURE — 73564 X-RAY EXAM KNEE 4 OR MORE: CPT | Mod: 50

## 2024-09-13 PROCEDURE — G2211 COMPLEX E/M VISIT ADD ON: CPT | Mod: NC

## 2024-09-13 RX ORDER — MELOXICAM 15 MG/1
15 TABLET ORAL
Qty: 30 | Refills: 1 | Status: ACTIVE | COMMUNITY
Start: 2024-09-13 | End: 1900-01-01

## 2024-09-13 NOTE — HISTORY OF PRESENT ILLNESS
[6] : 6 [Sharp] : sharp [Throbbing] : throbbing [Intermittent] : intermittent [Injection therapy] : injection therapy [Walking] : walking [Stairs] : stairs [de-identified] : 9.13.24 PATIENT HERE FOR BILATERAL KNEE PAIN. PATIENT STATES SHE IS HERE FOR A SECOND OPINION. SHE SAW DR. BRIAN WHO ADMINISTERED INJECTIONS TO THE LEFT KNEE WITH TEMP RELIEF. LAST SERIES WITHOUT ANY IMPROVEMENT.  PAIN WORSE WITH STAIRS AND WALKING.

## 2024-09-13 NOTE — PHYSICAL EXAM
[Bilateral] : knee bilaterally [5___] : hamstring 5[unfilled]/5 [] : patient ambulates without assistive device [de-identified] : extension 0 degrees [[id number]] : flexion 130 degrees

## 2024-09-13 NOTE — ASSESSMENT
[FreeTextEntry1] : 64 year F WITH MODERATE B/L KNEE PAIN, L>R. SHE HAS TRIED LT KNEE CSI AND GEL INJECTIONS WITH SOME RELIEF. PAIN WORSENS WITH STAIRS AND WALKING PROLONGED DISTANCES. PAIN IS AFFECTING ADL AND FUNCTIONAL ACTIVITIES. XRAYS REVIEWED WITH ADVANCED TRICOMPARTMENTAL OA. TREATMENT OPTIONS REVIEWED. QUESTIONS ANSWERED. EVENTUAL LT TKA DISCUSSED AT LENGTH. MEDICATION USE DISCUSSED. PRESCRIBED MOBIC 15MG TO BE TAKEN ONCE A DAY AS NEEDED FOR PAIN.

## (undated) DEVICE — SOL IRR POUR H2O 1000ML

## (undated) DEVICE — DRSG AQUACEL 3.5 X 10"

## (undated) DEVICE — GLV 6.5 PROTEXIS (WHITE)

## (undated) DEVICE — MARKING PEN W RULER

## (undated) DEVICE — TAPE SILK 3"

## (undated) DEVICE — WARMING BLANKET UPPER ADULT

## (undated) DEVICE — SUT POLYSORB 2-0 30" GS-21 UNDYED

## (undated) DEVICE — SOL IRR POUR H2O 250ML

## (undated) DEVICE — SOL IRR POUR H2O 1500ML

## (undated) DEVICE — PREP CHLORAPREP HI-LITE ORANGE 26ML

## (undated) DEVICE — DRSG ACE BANDAGE 6"

## (undated) DEVICE — DRILL BIT STRYKER ORTHO TRAUMA 4.9MM

## (undated) DEVICE — SUT QUILL MONODERM 0 1/2 CIRCLE TAPR 45CM 26MM

## (undated) DEVICE — SOL IRR POUR NS 0.9% 500ML

## (undated) DEVICE — TUBING TUR 2 PRONG

## (undated) DEVICE — HANDPIECE IRRIGATION W/ BATTERY PACKAND HIGH FLOW TIP

## (undated) DEVICE — SUT STRATAFIX SYMMETRIC PDS PLUS 1 18" CTX VIOLET

## (undated) DEVICE — GOWN TRIMAX LG

## (undated) DEVICE — DRAPE MAGNETIC INSTRUMENT MEDIUM

## (undated) DEVICE — DRSG WEBRIL 6"

## (undated) DEVICE — HOOD FLYTE STRYKER HELMET SHIELD

## (undated) DEVICE — DRSG TAPE MICROFOAM 3"

## (undated) DEVICE — POSITIONER FOAM EGG CRATE ULNAR 2PCS (PINK)

## (undated) DEVICE — SPECIMEN CONTAINER 100ML

## (undated) DEVICE — GLV 8.5 PROTEXIS (WHITE)

## (undated) DEVICE — DRAPE TOWEL BLUE 17" X 24"

## (undated) DEVICE — DRAPE MAYO STAND 30"

## (undated) DEVICE — GLV 8 PROTEXIS (CREAM) NEU-THERA

## (undated) DEVICE — GLV 7 PROTEXIS (WHITE)

## (undated) DEVICE — SUT MONOCRYL 2-0 27" SH UNDYED

## (undated) DEVICE — DRSG DERMABOND PRINEO 22CM

## (undated) DEVICE — DRILL BIT STRYKER ORTHO TRAUMA 8MM

## (undated) DEVICE — DRAPE IRRIGATION POUCH 19X23"

## (undated) DEVICE — SYR LUER LOK 30CC

## (undated) DEVICE — SOL BETADINE POUCH 0.75OZ STERILE

## (undated) DEVICE — GLV 7.5 PROTEXIS (WHITE)

## (undated) DEVICE — FOLEY TRAY 16FR 5CC LTX UMETER CLOSED

## (undated) DEVICE — HOOD FLYTE STRYKER SURGICOOL W PEELAWAY

## (undated) DEVICE — BLADE SCALPEL SAFETYLOCK #20

## (undated) DEVICE — SUT QUILL MONODERM 2-0 3/8 CIRCLE 45CM

## (undated) DEVICE — LIGHT PIPE

## (undated) DEVICE — PACK DAA HIP

## (undated) DEVICE — VENODYNE/SCD SLEEVE CALF LARGE

## (undated) DEVICE — SUT PDO 2 1/2 CIRCLE 40MM NDL 45CM

## (undated) DEVICE — DRAPE 3/4 SHEET W REINFORCEMENT 56X77"

## (undated) DEVICE — GLV 8 PROTEXIS (WHITE)

## (undated) DEVICE — DRILL TAP 6.5MM

## (undated) DEVICE — DRAPE SPLIT SHEET 77" X 120"

## (undated) DEVICE — GLV 8.5 PROTEXIS (BLUE)

## (undated) DEVICE — DRAPE U 47X51" LF STERILE

## (undated) DEVICE — MEDICATION LABELS W MARKER

## (undated) DEVICE — DRILL BIT STRYKER ORTHO TRAUMA 6.5MM

## (undated) DEVICE — STAPLER SKIN VISI-STAT 35 WIDE

## (undated) DEVICE — PACK HIP PINNING

## (undated) DEVICE — DRILL BIT STRYKER ORTHO TRAUMA 5.6MM

## (undated) DEVICE — SYR LUER LOK 20CC

## (undated) DEVICE — SUT POLYSORB 0 30" GS-21 UNDYED

## (undated) DEVICE — SAW BLADE STRYKER DUAL CUT 1.27X11 X90MM

## (undated) DEVICE — DRAPE INSTRUMENT POUCH 6.75" X 11"

## (undated) DEVICE — BLADE SCALPEL SAFETYLOCK #15

## (undated) DEVICE — GLV 9 DURAPRENE

## (undated) DEVICE — NDL HYPO SAFE 22G X 1.5" (BLACK)

## (undated) DEVICE — DRSG DERMABOND 0.7ML

## (undated) DEVICE — ELCTR BOVIE PENCIL SMOKE EVACUATION

## (undated) DEVICE — SUT POLYSORB 1 36" GS-21 UNDYED

## (undated) DEVICE — DRAPE SPLIT SHEET 77" X 108"

## (undated) DEVICE — SUT MONOCRYL 3-0 27" PS-2 UNDYED

## (undated) DEVICE — SUT VICRYL 1 27" CPX UNDYED

## (undated) DEVICE — LAP PAD 18 X 18"

## (undated) DEVICE — DRAPE IOBAN 23" X 23"